# Patient Record
Sex: FEMALE | Race: OTHER | NOT HISPANIC OR LATINO | URBAN - METROPOLITAN AREA
[De-identification: names, ages, dates, MRNs, and addresses within clinical notes are randomized per-mention and may not be internally consistent; named-entity substitution may affect disease eponyms.]

---

## 2023-12-22 ENCOUNTER — INPATIENT (INPATIENT)
Facility: HOSPITAL | Age: 68
LOS: 5 days | Discharge: HOME | DRG: 64 | End: 2023-12-28
Attending: STUDENT IN AN ORGANIZED HEALTH CARE EDUCATION/TRAINING PROGRAM | Admitting: STUDENT IN AN ORGANIZED HEALTH CARE EDUCATION/TRAINING PROGRAM
Payer: MEDICARE

## 2023-12-22 VITALS
WEIGHT: 147.05 LBS | DIASTOLIC BLOOD PRESSURE: 100 MMHG | OXYGEN SATURATION: 99 % | RESPIRATION RATE: 19 BRPM | SYSTOLIC BLOOD PRESSURE: 135 MMHG | TEMPERATURE: 98 F | HEART RATE: 109 BPM

## 2023-12-22 DIAGNOSIS — I62.9 NONTRAUMATIC INTRACRANIAL HEMORRHAGE, UNSPECIFIED: ICD-10-CM

## 2023-12-22 LAB
ALBUMIN SERPL ELPH-MCNC: 4.4 G/DL — SIGNIFICANT CHANGE UP (ref 3.5–5.2)
ALBUMIN SERPL ELPH-MCNC: 4.4 G/DL — SIGNIFICANT CHANGE UP (ref 3.5–5.2)
ALP SERPL-CCNC: 110 U/L — SIGNIFICANT CHANGE UP (ref 30–115)
ALP SERPL-CCNC: 110 U/L — SIGNIFICANT CHANGE UP (ref 30–115)
ALT FLD-CCNC: 14 U/L — SIGNIFICANT CHANGE UP (ref 0–41)
ALT FLD-CCNC: 14 U/L — SIGNIFICANT CHANGE UP (ref 0–41)
ANION GAP SERPL CALC-SCNC: 10 MMOL/L — SIGNIFICANT CHANGE UP (ref 7–14)
ANION GAP SERPL CALC-SCNC: 10 MMOL/L — SIGNIFICANT CHANGE UP (ref 7–14)
APTT BLD: 32.4 SEC — SIGNIFICANT CHANGE UP (ref 27–39.2)
APTT BLD: 32.4 SEC — SIGNIFICANT CHANGE UP (ref 27–39.2)
AST SERPL-CCNC: 24 U/L — SIGNIFICANT CHANGE UP (ref 0–41)
AST SERPL-CCNC: 24 U/L — SIGNIFICANT CHANGE UP (ref 0–41)
BASOPHILS # BLD AUTO: 0.05 K/UL — SIGNIFICANT CHANGE UP (ref 0–0.2)
BASOPHILS # BLD AUTO: 0.05 K/UL — SIGNIFICANT CHANGE UP (ref 0–0.2)
BASOPHILS NFR BLD AUTO: 0.4 % — SIGNIFICANT CHANGE UP (ref 0–1)
BASOPHILS NFR BLD AUTO: 0.4 % — SIGNIFICANT CHANGE UP (ref 0–1)
BILIRUB SERPL-MCNC: <0.2 MG/DL — SIGNIFICANT CHANGE UP (ref 0.2–1.2)
BILIRUB SERPL-MCNC: <0.2 MG/DL — SIGNIFICANT CHANGE UP (ref 0.2–1.2)
BUN SERPL-MCNC: 14 MG/DL — SIGNIFICANT CHANGE UP (ref 10–20)
BUN SERPL-MCNC: 14 MG/DL — SIGNIFICANT CHANGE UP (ref 10–20)
CALCIUM SERPL-MCNC: 9 MG/DL — SIGNIFICANT CHANGE UP (ref 8.4–10.4)
CALCIUM SERPL-MCNC: 9 MG/DL — SIGNIFICANT CHANGE UP (ref 8.4–10.4)
CHLORIDE SERPL-SCNC: 99 MMOL/L — SIGNIFICANT CHANGE UP (ref 98–110)
CHLORIDE SERPL-SCNC: 99 MMOL/L — SIGNIFICANT CHANGE UP (ref 98–110)
CO2 SERPL-SCNC: 25 MMOL/L — SIGNIFICANT CHANGE UP (ref 17–32)
CO2 SERPL-SCNC: 25 MMOL/L — SIGNIFICANT CHANGE UP (ref 17–32)
CREAT SERPL-MCNC: 0.6 MG/DL — LOW (ref 0.7–1.5)
CREAT SERPL-MCNC: 0.6 MG/DL — LOW (ref 0.7–1.5)
EGFR: 98 ML/MIN/1.73M2 — SIGNIFICANT CHANGE UP
EGFR: 98 ML/MIN/1.73M2 — SIGNIFICANT CHANGE UP
EOSINOPHIL # BLD AUTO: 0 K/UL — SIGNIFICANT CHANGE UP (ref 0–0.7)
EOSINOPHIL # BLD AUTO: 0 K/UL — SIGNIFICANT CHANGE UP (ref 0–0.7)
EOSINOPHIL NFR BLD AUTO: 0 % — SIGNIFICANT CHANGE UP (ref 0–8)
EOSINOPHIL NFR BLD AUTO: 0 % — SIGNIFICANT CHANGE UP (ref 0–8)
GLUCOSE SERPL-MCNC: 101 MG/DL — HIGH (ref 70–99)
GLUCOSE SERPL-MCNC: 101 MG/DL — HIGH (ref 70–99)
HCT VFR BLD CALC: 44.5 % — SIGNIFICANT CHANGE UP (ref 37–47)
HCT VFR BLD CALC: 44.5 % — SIGNIFICANT CHANGE UP (ref 37–47)
HGB BLD-MCNC: 14.4 G/DL — SIGNIFICANT CHANGE UP (ref 12–16)
HGB BLD-MCNC: 14.4 G/DL — SIGNIFICANT CHANGE UP (ref 12–16)
IMM GRANULOCYTES NFR BLD AUTO: 1.8 % — HIGH (ref 0.1–0.3)
IMM GRANULOCYTES NFR BLD AUTO: 1.8 % — HIGH (ref 0.1–0.3)
INR BLD: 1 RATIO — SIGNIFICANT CHANGE UP (ref 0.65–1.3)
INR BLD: 1 RATIO — SIGNIFICANT CHANGE UP (ref 0.65–1.3)
LYMPHOCYTES # BLD AUTO: 0.79 K/UL — LOW (ref 1.2–3.4)
LYMPHOCYTES # BLD AUTO: 0.79 K/UL — LOW (ref 1.2–3.4)
LYMPHOCYTES # BLD AUTO: 6.6 % — LOW (ref 20.5–51.1)
LYMPHOCYTES # BLD AUTO: 6.6 % — LOW (ref 20.5–51.1)
MCHC RBC-ENTMCNC: 27.5 PG — SIGNIFICANT CHANGE UP (ref 27–31)
MCHC RBC-ENTMCNC: 27.5 PG — SIGNIFICANT CHANGE UP (ref 27–31)
MCHC RBC-ENTMCNC: 32.4 G/DL — SIGNIFICANT CHANGE UP (ref 32–37)
MCHC RBC-ENTMCNC: 32.4 G/DL — SIGNIFICANT CHANGE UP (ref 32–37)
MCV RBC AUTO: 85.1 FL — SIGNIFICANT CHANGE UP (ref 81–99)
MCV RBC AUTO: 85.1 FL — SIGNIFICANT CHANGE UP (ref 81–99)
MONOCYTES # BLD AUTO: 0.7 K/UL — HIGH (ref 0.1–0.6)
MONOCYTES # BLD AUTO: 0.7 K/UL — HIGH (ref 0.1–0.6)
MONOCYTES NFR BLD AUTO: 5.8 % — SIGNIFICANT CHANGE UP (ref 1.7–9.3)
MONOCYTES NFR BLD AUTO: 5.8 % — SIGNIFICANT CHANGE UP (ref 1.7–9.3)
NEUTROPHILS # BLD AUTO: 10.25 K/UL — HIGH (ref 1.4–6.5)
NEUTROPHILS # BLD AUTO: 10.25 K/UL — HIGH (ref 1.4–6.5)
NEUTROPHILS NFR BLD AUTO: 85.4 % — HIGH (ref 42.2–75.2)
NEUTROPHILS NFR BLD AUTO: 85.4 % — HIGH (ref 42.2–75.2)
NRBC # BLD: 0 /100 WBCS — SIGNIFICANT CHANGE UP (ref 0–0)
NRBC # BLD: 0 /100 WBCS — SIGNIFICANT CHANGE UP (ref 0–0)
PLATELET # BLD AUTO: 299 K/UL — SIGNIFICANT CHANGE UP (ref 130–400)
PLATELET # BLD AUTO: 299 K/UL — SIGNIFICANT CHANGE UP (ref 130–400)
PMV BLD: 10.9 FL — HIGH (ref 7.4–10.4)
PMV BLD: 10.9 FL — HIGH (ref 7.4–10.4)
POTASSIUM SERPL-MCNC: 4.7 MMOL/L — SIGNIFICANT CHANGE UP (ref 3.5–5)
POTASSIUM SERPL-MCNC: 4.7 MMOL/L — SIGNIFICANT CHANGE UP (ref 3.5–5)
POTASSIUM SERPL-SCNC: 4.7 MMOL/L — SIGNIFICANT CHANGE UP (ref 3.5–5)
POTASSIUM SERPL-SCNC: 4.7 MMOL/L — SIGNIFICANT CHANGE UP (ref 3.5–5)
PROT SERPL-MCNC: 7.8 G/DL — SIGNIFICANT CHANGE UP (ref 6–8)
PROT SERPL-MCNC: 7.8 G/DL — SIGNIFICANT CHANGE UP (ref 6–8)
PROTHROM AB SERPL-ACNC: 11.4 SEC — SIGNIFICANT CHANGE UP (ref 9.95–12.87)
PROTHROM AB SERPL-ACNC: 11.4 SEC — SIGNIFICANT CHANGE UP (ref 9.95–12.87)
RBC # BLD: 5.23 M/UL — SIGNIFICANT CHANGE UP (ref 4.2–5.4)
RBC # BLD: 5.23 M/UL — SIGNIFICANT CHANGE UP (ref 4.2–5.4)
RBC # FLD: 13.3 % — SIGNIFICANT CHANGE UP (ref 11.5–14.5)
RBC # FLD: 13.3 % — SIGNIFICANT CHANGE UP (ref 11.5–14.5)
SODIUM SERPL-SCNC: 134 MMOL/L — LOW (ref 135–146)
SODIUM SERPL-SCNC: 134 MMOL/L — LOW (ref 135–146)
TROPONIN T, HIGH SENSITIVITY RESULT: 11 NG/L — SIGNIFICANT CHANGE UP (ref 6–13)
TROPONIN T, HIGH SENSITIVITY RESULT: 11 NG/L — SIGNIFICANT CHANGE UP (ref 6–13)
WBC # BLD: 12.01 K/UL — HIGH (ref 4.8–10.8)
WBC # BLD: 12.01 K/UL — HIGH (ref 4.8–10.8)
WBC # FLD AUTO: 12.01 K/UL — HIGH (ref 4.8–10.8)
WBC # FLD AUTO: 12.01 K/UL — HIGH (ref 4.8–10.8)

## 2023-12-22 PROCEDURE — 85025 COMPLETE CBC W/AUTO DIFF WBC: CPT

## 2023-12-22 PROCEDURE — 97530 THERAPEUTIC ACTIVITIES: CPT | Mod: GP

## 2023-12-22 PROCEDURE — 83036 HEMOGLOBIN GLYCOSYLATED A1C: CPT

## 2023-12-22 PROCEDURE — 93010 ELECTROCARDIOGRAM REPORT: CPT

## 2023-12-22 PROCEDURE — 85027 COMPLETE CBC AUTOMATED: CPT

## 2023-12-22 PROCEDURE — 36415 COLL VENOUS BLD VENIPUNCTURE: CPT

## 2023-12-22 PROCEDURE — 84100 ASSAY OF PHOSPHORUS: CPT

## 2023-12-22 PROCEDURE — 70450 CT HEAD/BRAIN W/O DYE: CPT | Mod: 26,59

## 2023-12-22 PROCEDURE — 80048 BASIC METABOLIC PNL TOTAL CA: CPT

## 2023-12-22 PROCEDURE — 84443 ASSAY THYROID STIM HORMONE: CPT

## 2023-12-22 PROCEDURE — 70450 CT HEAD/BRAIN W/O DYE: CPT | Mod: 26,77,59,MA

## 2023-12-22 PROCEDURE — 97166 OT EVAL MOD COMPLEX 45 MIN: CPT | Mod: GO

## 2023-12-22 PROCEDURE — 99291 CRITICAL CARE FIRST HOUR: CPT

## 2023-12-22 PROCEDURE — 84439 ASSAY OF FREE THYROXINE: CPT

## 2023-12-22 PROCEDURE — 70498 CT ANGIOGRAPHY NECK: CPT | Mod: 26,MA

## 2023-12-22 PROCEDURE — 70496 CT ANGIOGRAPHY HEAD: CPT | Mod: 26,MA

## 2023-12-22 PROCEDURE — 97162 PT EVAL MOD COMPLEX 30 MIN: CPT | Mod: GP

## 2023-12-22 PROCEDURE — 71045 X-RAY EXAM CHEST 1 VIEW: CPT | Mod: 26

## 2023-12-22 PROCEDURE — 70551 MRI BRAIN STEM W/O DYE: CPT

## 2023-12-22 PROCEDURE — 70450 CT HEAD/BRAIN W/O DYE: CPT

## 2023-12-22 PROCEDURE — 94760 N-INVAS EAR/PLS OXIMETRY 1: CPT

## 2023-12-22 PROCEDURE — 99291 CRITICAL CARE FIRST HOUR: CPT | Mod: GC

## 2023-12-22 PROCEDURE — 0225U NFCT DS DNA&RNA 21 SARSCOV2: CPT

## 2023-12-22 PROCEDURE — 93306 TTE W/DOPPLER COMPLETE: CPT

## 2023-12-22 PROCEDURE — 80053 COMPREHEN METABOLIC PANEL: CPT

## 2023-12-22 PROCEDURE — 83735 ASSAY OF MAGNESIUM: CPT

## 2023-12-22 PROCEDURE — 80061 LIPID PANEL: CPT

## 2023-12-22 PROCEDURE — 97116 GAIT TRAINING THERAPY: CPT | Mod: GP

## 2023-12-22 PROCEDURE — 86803 HEPATITIS C AB TEST: CPT

## 2023-12-22 PROCEDURE — 93005 ELECTROCARDIOGRAM TRACING: CPT

## 2023-12-22 RX ORDER — SODIUM CHLORIDE 9 MG/ML
1000 INJECTION INTRAMUSCULAR; INTRAVENOUS; SUBCUTANEOUS
Refills: 0 | Status: DISCONTINUED | OUTPATIENT
Start: 2023-12-22 | End: 2023-12-23

## 2023-12-22 RX ORDER — DORZOLAMIDE HYDROCHLORIDE TIMOLOL MALEATE 20; 5 MG/ML; MG/ML
1 SOLUTION/ DROPS OPHTHALMIC
Refills: 0 | Status: DISCONTINUED | OUTPATIENT
Start: 2023-12-22 | End: 2023-12-24

## 2023-12-22 RX ORDER — SENNA PLUS 8.6 MG/1
2 TABLET ORAL AT BEDTIME
Refills: 0 | Status: DISCONTINUED | OUTPATIENT
Start: 2023-12-22 | End: 2023-12-27

## 2023-12-22 RX ORDER — LATANOPROST 0.05 MG/ML
0 SOLUTION/ DROPS OPHTHALMIC; TOPICAL
Qty: 0 | Refills: 0 | DISCHARGE

## 2023-12-22 RX ORDER — LATANOPROST 0.05 MG/ML
1 SOLUTION/ DROPS OPHTHALMIC; TOPICAL
Refills: 0 | DISCHARGE

## 2023-12-22 RX ORDER — LATANOPROST 0.05 MG/ML
1 SOLUTION/ DROPS OPHTHALMIC; TOPICAL AT BEDTIME
Refills: 0 | Status: DISCONTINUED | OUTPATIENT
Start: 2023-12-22 | End: 2023-12-24

## 2023-12-22 RX ORDER — NICARDIPINE HYDROCHLORIDE 30 MG/1
5 CAPSULE, EXTENDED RELEASE ORAL
Qty: 40 | Refills: 0 | Status: DISCONTINUED | OUTPATIENT
Start: 2023-12-22 | End: 2023-12-24

## 2023-12-22 RX ORDER — BRIMONIDINE TARTRATE 2 MG/MG
0 SOLUTION/ DROPS OPHTHALMIC
Qty: 0 | Refills: 0 | DISCHARGE

## 2023-12-22 RX ORDER — DORZOLAMIDE HYDROCHLORIDE TIMOLOL MALEATE 20; 5 MG/ML; MG/ML
0 SOLUTION/ DROPS OPHTHALMIC
Qty: 0 | Refills: 0 | DISCHARGE

## 2023-12-22 RX ORDER — ACETAMINOPHEN 500 MG
650 TABLET ORAL EVERY 6 HOURS
Refills: 0 | Status: DISCONTINUED | OUTPATIENT
Start: 2023-12-22 | End: 2023-12-28

## 2023-12-22 RX ORDER — BRIMONIDINE TARTRATE 2 MG/MG
1 SOLUTION/ DROPS OPHTHALMIC
Refills: 0 | Status: DISCONTINUED | OUTPATIENT
Start: 2023-12-22 | End: 2023-12-24

## 2023-12-22 RX ORDER — PANTOPRAZOLE SODIUM 20 MG/1
40 TABLET, DELAYED RELEASE ORAL
Refills: 0 | Status: DISCONTINUED | OUTPATIENT
Start: 2023-12-22 | End: 2023-12-28

## 2023-12-22 RX ORDER — NICARDIPINE HYDROCHLORIDE 30 MG/1
5 CAPSULE, EXTENDED RELEASE ORAL
Qty: 40 | Refills: 0 | Status: DISCONTINUED | OUTPATIENT
Start: 2023-12-22 | End: 2023-12-22

## 2023-12-22 RX ORDER — CHLORHEXIDINE GLUCONATE 213 G/1000ML
1 SOLUTION TOPICAL
Refills: 0 | Status: DISCONTINUED | OUTPATIENT
Start: 2023-12-22 | End: 2023-12-28

## 2023-12-22 RX ORDER — OMEPRAZOLE 10 MG/1
1 CAPSULE, DELAYED RELEASE ORAL
Refills: 0 | DISCHARGE

## 2023-12-22 RX ORDER — LABETALOL HCL 100 MG
10 TABLET ORAL ONCE
Refills: 0 | Status: COMPLETED | OUTPATIENT
Start: 2023-12-22 | End: 2023-12-22

## 2023-12-22 RX ADMIN — SODIUM CHLORIDE 70 MILLILITER(S): 9 INJECTION INTRAMUSCULAR; INTRAVENOUS; SUBCUTANEOUS at 17:52

## 2023-12-22 RX ADMIN — Medication 10 MILLIGRAM(S): at 15:51

## 2023-12-22 RX ADMIN — NICARDIPINE HYDROCHLORIDE 25 MG/HR: 30 CAPSULE, EXTENDED RELEASE ORAL at 15:51

## 2023-12-22 RX ADMIN — NICARDIPINE HYDROCHLORIDE 25 MG/HR: 30 CAPSULE, EXTENDED RELEASE ORAL at 17:55

## 2023-12-22 NOTE — H&P ADULT - HISTORY OF PRESENT ILLNESS
68F w/ PMHx of GERD, glaucoma presents for L sided numbness and LUE weakness that started this morning at 10am. Pt initially went to urgent care for evaluation, however SBP was measured at 190, 220 on repeat; pt was instructed to go to ED. SBP in 220s in ED. CTH showed 1.3cm R thalamic IPH. Pt denies any AC/AP. Pt given Labetalol IVP and started on Nicardipine gtt.      Admission scores:  ICH: 0

## 2023-12-22 NOTE — H&P ADULT - NSHPPHYSICALEXAM_GEN_ALL_CORE
EXAMINATION:  General: WN/WD, no acute distress  HENT: NC/AT, moist oral mucosa  Eyes: Anicteric sclerae  Cardiac: [id number]  Lungs: Clear  Abdomen: Soft, non-tender, +BS  Extremities: No c/c/e  Skin/Incision Site: Clean, dry and intact  Neurologic: Awake, alert, fully oriented, follows commands, PERRL, VFFtc, EOMI, face symmetric, tongue midline, no drift, 5/5 strength RUE/RLE/LLE, 4+/5 strength LUE. Decreased sensation L face, LUE, LLE EXAMINATION:  General: WN/WD, no acute distress  HENT: NC/AT, moist oral mucosa  Eyes: Anicteric sclerae  Cardiac: [id number]  Lungs: Clear  Abdomen: Soft, non-tender, +BS  Extremities: No c/c/e  Skin/Incision Site: Clean, dry and intact  Neurologic: Awake, alert, fully oriented, follows commands, PERRL, VFFtc, EOMI, face symmetric, tongue midline, no drift, 5/5 strength RUE/RLE/LLE, 4+/5 strength LUE. Decreased sensation L face, LUE, LLE

## 2023-12-22 NOTE — ED ADULT NURSE NOTE - NSFALLHARMRISKINTERV_ED_ALL_ED
Assistance OOB with selected safe patient handling equipment if applicable/Assistance with ambulation/Communicate risk of Fall with Harm to all staff, patient, and family/Monitor gait and stability/Provide visual cue: red socks, yellow wristband, yellow gown, etc/Reinforce activity limits and safety measures with patient and family/Bed in lowest position, wheels locked, appropriate side rails in place/Call bell, personal items and telephone in reach/Instruct patient to call for assistance before getting out of bed/chair/stretcher/Non-slip footwear applied when patient is off stretcher/[place] to call system/Physically safe environment - no spills, clutter or unnecessary equipment/Purposeful Proactive Rounding/Room/bathroom lighting operational, light cord in reach Assistance OOB with selected safe patient handling equipment if applicable/Assistance with ambulation/Communicate risk of Fall with Harm to all staff, patient, and family/Monitor gait and stability/Provide visual cue: red socks, yellow wristband, yellow gown, etc/Reinforce activity limits and safety measures with patient and family/Bed in lowest position, wheels locked, appropriate side rails in place/Call bell, personal items and telephone in reach/Instruct patient to call for assistance before getting out of bed/chair/stretcher/Non-slip footwear applied when patient is off stretcher/[place] to call system/Physically safe environment - no spills, clutter or unnecessary equipment/Purposeful Proactive Rounding/Room/bathroom lighting operational, light cord in reach

## 2023-12-22 NOTE — ED PROVIDER NOTE - PHYSICAL EXAMINATION
VITAL SIGNS: I have reviewed nursing notes and confirm.  CONSTITUTIONAL: Well-developed; well-nourished; in no acute distress.  SKIN: Skin exam is warm and dry, no acute rash.  HEAD: Normocephalic; atraumatic.  EYES: PERRL, EOM intact; conjunctiva and sclera clear.  ENT: mmm  CARD: S1, S2 normal; no murmurs, gallops, or rubs. Regular rate and rhythm.  RESP: Normal respiratory effort, no tachypnea or distress. Lungs CTAB, no wheezes, rales or rhonchi.  ABD: soft, NT/ND.  EXT: Normal ROM. No clubbing, cyanosis or edema.  Neuro: A&Ox3, normal speech, face appears symmetric to me without droops. decreased sensation to L side of face in all distributions otherwise CN II-XII intact, FROM & strength 5/5 x4 extremities. neurology team exam with mild L arm drift however no drift on my exam. Normal gait however son states smaller steps than normal  PSYCH: Cooperative, appropriate.

## 2023-12-22 NOTE — ED PROVIDER NOTE - CLINICAL SUMMARY MEDICAL DECISION MAKING FREE TEXT BOX
68-year-old female no significant past medical history presenting for evaluation of left-sided facial numbness, left upper and lower extremity numbness/weakness.  Symptom onset 10 AM, last known well immediately prior.  Well appearing, NAD, non toxic. NCAT PERRLA EOMI neck supple non tender normal wob cta bl rrr abdomen s nt nd no rebound no guarding WWPx4 sensory deficit to left face, upper and lower extremity, left upper extremity drift, otherwise neuro intact.  Stroke code called upon arrival.  Labs imaging reviewed.  Patient found to have right thalamic hemorrhage.  Patient severely hypertensive, status post labetalol, Cardene drip.  Status post evaluation by neurology, neurosurgery and neurocritical care. 68-year-old female no significant past medical history presenting for evaluation of left-sided facial numbness, left upper and lower extremity numbness/weakness.  Symptom onset 10 AM, last known well immediately prior.  Well appearing, NAD, non toxic. NCAT PERRLA EOMI neck supple non tender normal wob cta bl rrr abdomen s nt nd no rebound no guarding WWPx4 sensory deficit to left face, upper and lower extremity, left upper extremity drift, otherwise neuro intact.  Stroke code called upon arrival.  Labs imaging reviewed.  Patient found to have right thalamic hemorrhage.  Patient severely hypertensive, status post labetalol, Cardene drip.  Status post evaluation by neurology, neurosurgery and neurocritical care. will admit for further eval

## 2023-12-22 NOTE — ED PROVIDER NOTE - PROGRESS NOTE DETAILS
JAYDEN: Stroke code called on arrival.  Intraparenchymal bleeding noted.  Patient brought back to critical care, given labetalol and nicardipine drip for  systolic.  Received CT angios.  Arterial line placed.  Patient admitted to neurocritical care

## 2023-12-22 NOTE — H&P ADULT - ASSESSMENT
ASSESSMENT/PLAN: 68F w/ PMHx of GERD, glaucoma admitted with R thalamic IPH (ICH 0), hypertensive emergency    NEURO:  q1h neurochecks  Repeat CTH 4hrs  CTA negative  NSGY following- no intervention  Activity: [] OOB as tolerated [] Bedrest [x] PT [x] OT [] PMNR    PULM:  Incentive spirometry, mobilize as tolerated  SpO2>94%  HOB>30  Aspiration precautions    CV:  -180  Nicardipine gtt  EKG  TTE    RENAL:  NS at 70mL/hr  Monitor I&Os    GI:  Diet: Dysphagia screen and then advance diet as tolerated  GI prophylaxis [] not indicated [x] PPI- home med [] pepcid  Bowel regimen [] miralax [x] senna [] other:    ENDO:   Goal -180  Check hgba1c, lipid panel, TFTs    HEME/ONC:  VTE prophylaxis: [x] SCDs [] chemoprophylaxis [x] hold chemoprophylaxis due to: ICH    ID:  Maintain normothermia    MISC:    SOCIAL/FAMILY:  [] awaiting [x] updated at bedside [] family meeting    CODE STATUS:  [x] Full Code [] DNR [] DNI [] Palliative/Comfort Care    DISPOSITION:  [x] ICU [] Stroke Unit [] Floor [] CEU [] Tele ASSESSMENT/PLAN: 68F w/ PMHx of GERD, glaucoma admitted with R thalamic IPH (ICH 0), hypertensive emergency    NEURO:  q1h neurochecks  Repeat CTH 4hrs  CTA negative  NSGY following- no intervention  Activity: [] OOB as tolerated [] Bedrest [x] PT [x] OT [] PMNR    PULM:  Incentive spirometry, mobilize as tolerated  SpO2>94%  HOB>30  Aspiration precautions    CV:  Pt presented with -240- so would decrease SBP by 20% in first hour, then gradually decrease judiciously over next several hours as patient tolerates (and will ensure stability interval CTH)  Nicardipine gtt  EKG  TTE    RENAL:  NS at 70mL/hr  Monitor I&Os    GI:  Diet: Dysphagia screen and then advance diet as tolerated  GI prophylaxis [] not indicated [x] PPI- home med [] pepcid  Bowel regimen [] miralax [x] senna [] other:    ENDO:   Goal -180  Check hgba1c, lipid panel, TFTs    HEME/ONC:  VTE prophylaxis: [x] SCDs [] chemoprophylaxis [x] hold chemoprophylaxis due to: ICH    ID:  Maintain normothermia    MISC:    SOCIAL/FAMILY:  [] awaiting [x] updated at bedside [] family meeting    CODE STATUS:  [x] Full Code [] DNR [] DNI [] Palliative/Comfort Care    DISPOSITION:  [x] ICU [] Stroke Unit [] Floor [] CEU [] Tele

## 2023-12-22 NOTE — ED ADULT NURSE REASSESSMENT NOTE - NS ED NURSE REASSESS COMMENT FT1
Pt received from previous RN on continuous cardiac monitoring with left radial a line. Pt NICU admit with ICH.   CTH showed R thalamic IPH - neuro checks q1 hour with pending repeat head CT.   Pt  when received - Nicardipine drip restarted at 5mg/hr and a line zeroed out.  Family at bedside, aware and in agreement with plan of care.

## 2023-12-22 NOTE — H&P ADULT - NSHPLABSRESULTS_GEN_ALL_CORE
LABS:  Na: 134 (12-22 @ 16:18)  K: 4.7 (12-22 @ 16:18)  Cl: 99 (12-22 @ 16:18)  CO2: 25 (12-22 @ 16:18)  BUN: 14 (12-22 @ 16:18)  Cr: 0.6 (12-22 @ 16:18)  Glu: 101(12-22 @ 16:18)    Hgb: 14.4 (12-22 @ 16:18)  Hct: 44.5 (12-22 @ 16:18)  WBC: 12.01 (12-22 @ 16:18)  Plt: 299 (12-22 @ 16:18)    INR: 1.00 12-22-23 @ 16:18  PTT: 32.4 12-22-23 @ 16:18      LIVER FUNCTIONS - ( 22 Dec 2023 16:18 )  Alb: 4.4 g/dL / Pro: 7.8 g/dL / ALK PHOS: 110 U/L / ALT: 14 U/L / AST: 24 U/L / GGT: x             < from: CT Brain Stroke Protocol (12.22.23 @ 15:41) >    IMPRESSION:    Right thalamic parenchymal hematoma measuring up to 1.3 cm.    < end of copied text >      < from: CT Angio Neck Stroke Protocol w/ IV Cont (12.22.23 @ 16:25) >      IMPRESSION:    No evidence of major vascular stenosis or occlusion.    < end of copied text >

## 2023-12-22 NOTE — CONSULT NOTE ADULT - SUBJECTIVE AND OBJECTIVE BOX
HPI: 67 y/o female transient left sided weakness starting at 10 am this am, comes in now with mostly complaints of decreased sensation of her left side as per family member, patient not on blood thinners. BP on admission was systolic of 220.      PAST MEDICAL & SURGICAL HISTORY: Glaucoma, Gerd      Home Medications: Eye drops, Omiprazole      Allergies    No Known Allergies    Intolerances        MEDICATIONS  (STANDING):  niCARdipine Infusion 5 mG/Hr (25 mL/Hr) IV Continuous <Continuous>    MEDICATIONS  (PRN):      ICU Vital Signs Last 24 Hrs  T(C): 36.8 (22 Dec 2023 15:21), Max: 36.8 (22 Dec 2023 15:21)  T(F): 98.3 (22 Dec 2023 15:21), Max: 98.3 (22 Dec 2023 15:21)  HR: 93 (22 Dec 2023 15:53) (93 - 109)  BP: 191/91 (22 Dec 2023 15:53) (135/100 - 191/91)  BP(mean): --  ABP: --  ABP(mean): --  RR: 18 (22 Dec 2023 15:53) (18 - 19)  SpO2: 98% (22 Dec 2023 15:53) (98% - 99%)    O2 Parameters below as of 22 Dec 2023 15:53  Patient On (Oxygen Delivery Method): room air    I&O's Detail    AAOX3. Verbal function intact  tongue midline, facial motions symmetric  PERRLA, EOMI  Pronator Drift: neg  Motor: MAEx4, 5/5 power in b/l UE and LE  Sensation: slight decreased sensation of extremities left side      Imaging: < from: CT Brain Stroke Protocol (12.22.23 @ 15:41) >    IMPRESSION:    Right thalamic parenchymal hematoma measuring up to 1.3 cm.    Spoke with STEPHY CONTRERAS MD on 12/22/2023 3:48 PM with readback.    < end of copied text >      Assessment/Plan small right thalamic bleed, Recommend CTA, control BP, RHCT, neurology

## 2023-12-22 NOTE — ED PROVIDER NOTE - OBJECTIVE STATEMENT
68-year-old female with PMH of GERD, glaucoma, left eye blindness, presents ED for evaluation of left-sided facial numbness since 10 AM.  Also reports left arm weakness since this time.  Denies recent fall or trauma, vision hearing changes, anticoagulation use, CP, SOB, palpitations, abdominal pain, N/V/D, extremity numbness.

## 2023-12-23 LAB
A1C WITH ESTIMATED AVERAGE GLUCOSE RESULT: 5.9 % — HIGH (ref 4–5.6)
A1C WITH ESTIMATED AVERAGE GLUCOSE RESULT: 5.9 % — HIGH (ref 4–5.6)
ALBUMIN SERPL ELPH-MCNC: 4 G/DL — SIGNIFICANT CHANGE UP (ref 3.5–5.2)
ALBUMIN SERPL ELPH-MCNC: 4 G/DL — SIGNIFICANT CHANGE UP (ref 3.5–5.2)
ALP SERPL-CCNC: 95 U/L — SIGNIFICANT CHANGE UP (ref 30–115)
ALP SERPL-CCNC: 95 U/L — SIGNIFICANT CHANGE UP (ref 30–115)
ALT FLD-CCNC: 12 U/L — SIGNIFICANT CHANGE UP (ref 0–41)
ALT FLD-CCNC: 12 U/L — SIGNIFICANT CHANGE UP (ref 0–41)
ANION GAP SERPL CALC-SCNC: 10 MMOL/L — SIGNIFICANT CHANGE UP (ref 7–14)
ANION GAP SERPL CALC-SCNC: 10 MMOL/L — SIGNIFICANT CHANGE UP (ref 7–14)
AST SERPL-CCNC: 13 U/L — SIGNIFICANT CHANGE UP (ref 0–41)
AST SERPL-CCNC: 13 U/L — SIGNIFICANT CHANGE UP (ref 0–41)
BASOPHILS # BLD AUTO: 0.04 K/UL — SIGNIFICANT CHANGE UP (ref 0–0.2)
BASOPHILS # BLD AUTO: 0.04 K/UL — SIGNIFICANT CHANGE UP (ref 0–0.2)
BASOPHILS NFR BLD AUTO: 0.3 % — SIGNIFICANT CHANGE UP (ref 0–1)
BASOPHILS NFR BLD AUTO: 0.3 % — SIGNIFICANT CHANGE UP (ref 0–1)
BILIRUB SERPL-MCNC: 0.4 MG/DL — SIGNIFICANT CHANGE UP (ref 0.2–1.2)
BILIRUB SERPL-MCNC: 0.4 MG/DL — SIGNIFICANT CHANGE UP (ref 0.2–1.2)
BUN SERPL-MCNC: 9 MG/DL — LOW (ref 10–20)
BUN SERPL-MCNC: 9 MG/DL — LOW (ref 10–20)
CALCIUM SERPL-MCNC: 8.4 MG/DL — SIGNIFICANT CHANGE UP (ref 8.4–10.5)
CALCIUM SERPL-MCNC: 8.4 MG/DL — SIGNIFICANT CHANGE UP (ref 8.4–10.5)
CHLORIDE SERPL-SCNC: 106 MMOL/L — SIGNIFICANT CHANGE UP (ref 98–110)
CHLORIDE SERPL-SCNC: 106 MMOL/L — SIGNIFICANT CHANGE UP (ref 98–110)
CHOLEST SERPL-MCNC: 155 MG/DL — SIGNIFICANT CHANGE UP
CHOLEST SERPL-MCNC: 155 MG/DL — SIGNIFICANT CHANGE UP
CO2 SERPL-SCNC: 22 MMOL/L — SIGNIFICANT CHANGE UP (ref 17–32)
CO2 SERPL-SCNC: 22 MMOL/L — SIGNIFICANT CHANGE UP (ref 17–32)
CREAT SERPL-MCNC: <0.5 MG/DL — LOW (ref 0.7–1.5)
CREAT SERPL-MCNC: <0.5 MG/DL — LOW (ref 0.7–1.5)
EGFR: 108 ML/MIN/1.73M2 — SIGNIFICANT CHANGE UP
EGFR: 108 ML/MIN/1.73M2 — SIGNIFICANT CHANGE UP
EOSINOPHIL # BLD AUTO: 0.02 K/UL — SIGNIFICANT CHANGE UP (ref 0–0.7)
EOSINOPHIL # BLD AUTO: 0.02 K/UL — SIGNIFICANT CHANGE UP (ref 0–0.7)
EOSINOPHIL NFR BLD AUTO: 0.2 % — SIGNIFICANT CHANGE UP (ref 0–8)
EOSINOPHIL NFR BLD AUTO: 0.2 % — SIGNIFICANT CHANGE UP (ref 0–8)
ESTIMATED AVERAGE GLUCOSE: 123 MG/DL — HIGH (ref 68–114)
ESTIMATED AVERAGE GLUCOSE: 123 MG/DL — HIGH (ref 68–114)
GLUCOSE SERPL-MCNC: 118 MG/DL — HIGH (ref 70–99)
GLUCOSE SERPL-MCNC: 118 MG/DL — HIGH (ref 70–99)
HCT VFR BLD CALC: 39.4 % — SIGNIFICANT CHANGE UP (ref 37–47)
HCT VFR BLD CALC: 39.4 % — SIGNIFICANT CHANGE UP (ref 37–47)
HCV AB S/CO SERPL IA: 0.04 COI — SIGNIFICANT CHANGE UP
HCV AB S/CO SERPL IA: 0.04 COI — SIGNIFICANT CHANGE UP
HCV AB SERPL-IMP: SIGNIFICANT CHANGE UP
HCV AB SERPL-IMP: SIGNIFICANT CHANGE UP
HDLC SERPL-MCNC: 51 MG/DL — SIGNIFICANT CHANGE UP
HDLC SERPL-MCNC: 51 MG/DL — SIGNIFICANT CHANGE UP
HGB BLD-MCNC: 13 G/DL — SIGNIFICANT CHANGE UP (ref 12–16)
HGB BLD-MCNC: 13 G/DL — SIGNIFICANT CHANGE UP (ref 12–16)
IMM GRANULOCYTES NFR BLD AUTO: 0.3 % — SIGNIFICANT CHANGE UP (ref 0.1–0.3)
IMM GRANULOCYTES NFR BLD AUTO: 0.3 % — SIGNIFICANT CHANGE UP (ref 0.1–0.3)
LIPID PNL WITH DIRECT LDL SERPL: 94 MG/DL — SIGNIFICANT CHANGE UP
LIPID PNL WITH DIRECT LDL SERPL: 94 MG/DL — SIGNIFICANT CHANGE UP
LYMPHOCYTES # BLD AUTO: 1.39 K/UL — SIGNIFICANT CHANGE UP (ref 1.2–3.4)
LYMPHOCYTES # BLD AUTO: 1.39 K/UL — SIGNIFICANT CHANGE UP (ref 1.2–3.4)
LYMPHOCYTES # BLD AUTO: 12.1 % — LOW (ref 20.5–51.1)
LYMPHOCYTES # BLD AUTO: 12.1 % — LOW (ref 20.5–51.1)
MAGNESIUM SERPL-MCNC: 2.3 MG/DL — SIGNIFICANT CHANGE UP (ref 1.8–2.4)
MAGNESIUM SERPL-MCNC: 2.3 MG/DL — SIGNIFICANT CHANGE UP (ref 1.8–2.4)
MCHC RBC-ENTMCNC: 27.8 PG — SIGNIFICANT CHANGE UP (ref 27–31)
MCHC RBC-ENTMCNC: 27.8 PG — SIGNIFICANT CHANGE UP (ref 27–31)
MCHC RBC-ENTMCNC: 33 G/DL — SIGNIFICANT CHANGE UP (ref 32–37)
MCHC RBC-ENTMCNC: 33 G/DL — SIGNIFICANT CHANGE UP (ref 32–37)
MCV RBC AUTO: 84.4 FL — SIGNIFICANT CHANGE UP (ref 81–99)
MCV RBC AUTO: 84.4 FL — SIGNIFICANT CHANGE UP (ref 81–99)
MONOCYTES # BLD AUTO: 0.72 K/UL — HIGH (ref 0.1–0.6)
MONOCYTES # BLD AUTO: 0.72 K/UL — HIGH (ref 0.1–0.6)
MONOCYTES NFR BLD AUTO: 6.3 % — SIGNIFICANT CHANGE UP (ref 1.7–9.3)
MONOCYTES NFR BLD AUTO: 6.3 % — SIGNIFICANT CHANGE UP (ref 1.7–9.3)
NEUTROPHILS # BLD AUTO: 9.27 K/UL — HIGH (ref 1.4–6.5)
NEUTROPHILS # BLD AUTO: 9.27 K/UL — HIGH (ref 1.4–6.5)
NEUTROPHILS NFR BLD AUTO: 80.8 % — HIGH (ref 42.2–75.2)
NEUTROPHILS NFR BLD AUTO: 80.8 % — HIGH (ref 42.2–75.2)
NON HDL CHOLESTEROL: 104 MG/DL — SIGNIFICANT CHANGE UP
NON HDL CHOLESTEROL: 104 MG/DL — SIGNIFICANT CHANGE UP
NRBC # BLD: 0 /100 WBCS — SIGNIFICANT CHANGE UP (ref 0–0)
NRBC # BLD: 0 /100 WBCS — SIGNIFICANT CHANGE UP (ref 0–0)
PHOSPHATE SERPL-MCNC: 3 MG/DL — SIGNIFICANT CHANGE UP (ref 2.1–4.9)
PHOSPHATE SERPL-MCNC: 3 MG/DL — SIGNIFICANT CHANGE UP (ref 2.1–4.9)
PLATELET # BLD AUTO: 287 K/UL — SIGNIFICANT CHANGE UP (ref 130–400)
PLATELET # BLD AUTO: 287 K/UL — SIGNIFICANT CHANGE UP (ref 130–400)
PMV BLD: 10.6 FL — HIGH (ref 7.4–10.4)
PMV BLD: 10.6 FL — HIGH (ref 7.4–10.4)
POTASSIUM SERPL-MCNC: 3.8 MMOL/L — SIGNIFICANT CHANGE UP (ref 3.5–5)
POTASSIUM SERPL-MCNC: 3.8 MMOL/L — SIGNIFICANT CHANGE UP (ref 3.5–5)
POTASSIUM SERPL-SCNC: 3.8 MMOL/L — SIGNIFICANT CHANGE UP (ref 3.5–5)
POTASSIUM SERPL-SCNC: 3.8 MMOL/L — SIGNIFICANT CHANGE UP (ref 3.5–5)
PROT SERPL-MCNC: 6.7 G/DL — SIGNIFICANT CHANGE UP (ref 6–8)
PROT SERPL-MCNC: 6.7 G/DL — SIGNIFICANT CHANGE UP (ref 6–8)
RBC # BLD: 4.67 M/UL — SIGNIFICANT CHANGE UP (ref 4.2–5.4)
RBC # BLD: 4.67 M/UL — SIGNIFICANT CHANGE UP (ref 4.2–5.4)
RBC # FLD: 13.5 % — SIGNIFICANT CHANGE UP (ref 11.5–14.5)
RBC # FLD: 13.5 % — SIGNIFICANT CHANGE UP (ref 11.5–14.5)
SODIUM SERPL-SCNC: 138 MMOL/L — SIGNIFICANT CHANGE UP (ref 135–146)
SODIUM SERPL-SCNC: 138 MMOL/L — SIGNIFICANT CHANGE UP (ref 135–146)
T4 FREE SERPL-MCNC: 1.1 NG/DL — SIGNIFICANT CHANGE UP (ref 0.9–1.8)
T4 FREE SERPL-MCNC: 1.1 NG/DL — SIGNIFICANT CHANGE UP (ref 0.9–1.8)
TRIGL SERPL-MCNC: 53 MG/DL — SIGNIFICANT CHANGE UP
TRIGL SERPL-MCNC: 53 MG/DL — SIGNIFICANT CHANGE UP
TSH SERPL-MCNC: 2.29 UIU/ML — SIGNIFICANT CHANGE UP (ref 0.27–4.2)
TSH SERPL-MCNC: 2.29 UIU/ML — SIGNIFICANT CHANGE UP (ref 0.27–4.2)
WBC # BLD: 11.48 K/UL — HIGH (ref 4.8–10.8)
WBC # BLD: 11.48 K/UL — HIGH (ref 4.8–10.8)
WBC # FLD AUTO: 11.48 K/UL — HIGH (ref 4.8–10.8)
WBC # FLD AUTO: 11.48 K/UL — HIGH (ref 4.8–10.8)

## 2023-12-23 PROCEDURE — 70450 CT HEAD/BRAIN W/O DYE: CPT | Mod: 26

## 2023-12-23 PROCEDURE — 99291 CRITICAL CARE FIRST HOUR: CPT | Mod: GC

## 2023-12-23 RX ORDER — LABETALOL HCL 100 MG
10 TABLET ORAL
Refills: 0 | Status: DISCONTINUED | OUTPATIENT
Start: 2023-12-23 | End: 2023-12-26

## 2023-12-23 RX ORDER — ONDANSETRON 8 MG/1
4 TABLET, FILM COATED ORAL ONCE
Refills: 0 | Status: DISCONTINUED | OUTPATIENT
Start: 2023-12-23 | End: 2023-12-28

## 2023-12-23 RX ORDER — HYDRALAZINE HCL 50 MG
5 TABLET ORAL
Refills: 0 | Status: DISCONTINUED | OUTPATIENT
Start: 2023-12-23 | End: 2023-12-26

## 2023-12-23 RX ORDER — POLYETHYLENE GLYCOL 3350 17 G/17G
17 POWDER, FOR SOLUTION ORAL
Refills: 0 | Status: DISCONTINUED | OUTPATIENT
Start: 2023-12-23 | End: 2023-12-27

## 2023-12-23 RX ORDER — DORZOLAMIDE HYDROCHLORIDE TIMOLOL MALEATE 20; 5 MG/ML; MG/ML
1 SOLUTION/ DROPS OPHTHALMIC EVERY 12 HOURS
Refills: 0 | Status: DISCONTINUED | OUTPATIENT
Start: 2023-12-23 | End: 2023-12-28

## 2023-12-23 RX ADMIN — SENNA PLUS 2 TABLET(S): 8.6 TABLET ORAL at 22:52

## 2023-12-23 RX ADMIN — BRIMONIDINE TARTRATE 1 DROP(S): 2 SOLUTION/ DROPS OPHTHALMIC at 18:43

## 2023-12-23 RX ADMIN — BRIMONIDINE TARTRATE 1 DROP(S): 2 SOLUTION/ DROPS OPHTHALMIC at 05:42

## 2023-12-23 RX ADMIN — POLYETHYLENE GLYCOL 3350 17 GRAM(S): 17 POWDER, FOR SOLUTION ORAL at 18:43

## 2023-12-23 RX ADMIN — Medication 650 MILLIGRAM(S): at 12:30

## 2023-12-23 RX ADMIN — Medication 10 MILLIGRAM(S): at 04:36

## 2023-12-23 RX ADMIN — CHLORHEXIDINE GLUCONATE 1 APPLICATION(S): 213 SOLUTION TOPICAL at 05:44

## 2023-12-23 RX ADMIN — Medication 10 MILLIGRAM(S): at 17:11

## 2023-12-23 RX ADMIN — Medication 650 MILLIGRAM(S): at 12:27

## 2023-12-23 RX ADMIN — LATANOPROST 1 DROP(S): 0.05 SOLUTION/ DROPS OPHTHALMIC; TOPICAL at 22:51

## 2023-12-23 RX ADMIN — PANTOPRAZOLE SODIUM 40 MILLIGRAM(S): 20 TABLET, DELAYED RELEASE ORAL at 06:03

## 2023-12-23 NOTE — PHYSICAL THERAPY INITIAL EVALUATION ADULT - ADDITIONAL COMMENTS
Pt resides with family in a private house using 4 steps to enter and 9 to access 2nd floor. Pt used to be independent with overall functional mobility, able to ambulate without AD at baseline

## 2023-12-23 NOTE — PHYSICAL THERAPY INITIAL EVALUATION ADULT - GAIT TRAINING, PT EVAL
Pt will ambulate using RW or least restrictive AD for 100 ft with supervision by discharge to facilitate return to PLOF. Pt will negotiate 9 steps using 1 HR under  supervision

## 2023-12-23 NOTE — PROGRESS NOTE ADULT - SUBJECTIVE AND OBJECTIVE BOX
SUMMARY:    68F w/ PMHx of GERD, glaucoma presents for L sided numbness and LUE weakness that started this morning at 10am. Pt initially went to urgent care for evaluation, however SBP was measured at 190, 220 on repeat; pt was instructed to go to ED. SBP in 220s in ED. CTH showed 1.3cm R thalamic IPH. Pt denies any AC/AP. Pt given Labetalol IVP and started on Nicardipine gtt.      OVERNIGHT EVENTS:     ADMISSION SCORES:   GCS: 15 ICH Score: 0    SEDATION SCORES:  RASS: CAM-ICU:     REVIEW OF SYSTEMS:    VITALS: [] Reviewed    IMAGING/DATA: [] Reviewed    IVF FLUIDS/MEDICATIONS: [] Reviewed    ALLERGIES: Allergies    No Known Allergies    Intolerances      DEVICES:   [] Restraints [] PIVs [] ET tube [] central line [] PICC [] arterial line [] holder [] NGT/OGT [] EVD [] LD [] ALANA/HMV [] LiCOX [] ICP monitor [] Trach [] PEG [] Chest Tube [] other:    EXAMINATION:  General: No acute distress  HEENT: Anicteric sclerae  Cardiac: [id number]  Lungs: Clear  Abdomen: Soft, non-tender, +BS  Extremities: No c/c/e  Skin/Incision Site: Clean, dry and intact  Neurologic: Awake, alert, fully oriented, follows commands, PERRL, VFFtc, EOMI, face symmetric, tongue midline, no drift, full strength    ICU Vital Signs 24 Hrs  T(C): 36.9 (22 Dec 2023 21:30), Max: 36.9 (22 Dec 2023 21:30)  T(F): 98.4 (22 Dec 2023 21:30), Max: 98.4 (22 Dec 2023 21:30)  HR: 102 (22 Dec 2023 23:55) (93 - 110)  BP: 210/98 (22 Dec 2023 19:15) (130/51 - 244/115)  BP(mean): --  ABP: 156/66 (22 Dec 2023 23:55) (128/54 - 172/82)  ABP(mean): --  RR: 16 (22 Dec 2023 23:55) (16 - 20)  SpO2: 99% (22 Dec 2023 23:55) (98% - 99%)      acetaminophen     Tablet .. 650 milliGRAM(s) Oral every 6 hours PRN  brimonidine 0.2% Ophthalmic Solution 1 Drop(s) Both EYES two times a day  chlorhexidine 2% Cloths 1 Application(s) Topical <User Schedule>  dorzolamide 2%/timolol 0.5% Ophthalmic Solution 1 Drop(s) Both EYES two times a day  latanoprost 0.005% Ophthalmic Solution 1 Drop(s) Both EYES at bedtime  niCARdipine Infusion 5 mG/Hr (25 mL/Hr) IV Continuous <Continuous>  pantoprazole    Tablet 40 milliGRAM(s) Oral before breakfast  senna 2 Tablet(s) Oral at bedtime  sodium chloride 0.9%. 1000 milliLiter(s) (70 mL/Hr) IV Continuous <Continuous>      LABS:  Na: 134 (12-22 @ 16:18)  K: 4.7 (12-22 @ 16:18)  Cl: 99 (12-22 @ 16:18)  CO2: 25 (12-22 @ 16:18)  BUN: 14 (12-22 @ 16:18)  Cr: 0.6 (12-22 @ 16:18)  Glu: 101(12-22 @ 16:18)    Hgb: 14.4 (12-22 @ 16:18)  Hct: 44.5 (12-22 @ 16:18)  WBC: 12.01 (12-22 @ 16:18)  Plt: 299 (12-22 @ 16:18)    INR: 1.00 12-22-23 @ 16:18  PTT: 32.4 12-22-23 @ 16:18          LIVER FUNCTIONS - ( 22 Dec 2023 16:18 )  Alb: 4.4 g/dL / Pro: 7.8 g/dL / ALK PHOS: 110 U/L / ALT: 14 U/L / AST: 24 U/L / GGT: x                SUMMARY:    68F w/ PMHx of GERD, glaucoma presents for L sided numbness and LUE weakness that started this morning at 10am. Pt initially went to urgent care for evaluation, however SBP was measured at 190, 220 on repeat; pt was instructed to go to ED. SBP in 220s in ED. CTH showed 1.3cm R thalamic IPH. Pt denies any AC/AP. Pt given Labetalol IVP and started on Nicardipine gtt.      OVERNIGHT EVENTS:     ADMISSION SCORES:   GCS: 15 ICH Score: 0    SEDATION SCORES:  RASS: CAM-ICU:     REVIEW OF SYSTEMS:    VITALS: [] Reviewed    IMAGING/DATA: [] Reviewed    IVF FLUIDS/MEDICATIONS: [] Reviewed    ALLERGIES: Allergies    No Known Allergies    Intolerances      DEVICES:   [] Restraints [] PIVs [] ET tube [] central line [] PICC [] arterial line [] holder [] NGT/OGT [] EVD [] LD [] ALANA/HMV [] LiCOX [] ICP monitor [] Trach [] PEG [] Chest Tube [] other:    EXAMINATION:  General: No acute distress  HEENT: Anicteric sclerae  Cardiac: [id number]  Lungs: Clear  Abdomen: Soft, non-tender, +BS  Extremities: No c/c/e  Skin/Incision Site: Clean, dry and intact  Neurologic: Awake, alert, fully oriented, follows commands, PERRL, VFFtc, EOMI, face symmetric, tongue midline, no drift, full strength    ICU Vital Signs 24 Hrs  T(C): 36.9 (22 Dec 2023 21:30), Max: 36.9 (22 Dec 2023 21:30)  T(F): 98.4 (22 Dec 2023 21:30), Max: 98.4 (22 Dec 2023 21:30)  HR: 102 (22 Dec 2023 23:55) (93 - 110)  BP: 210/98 (22 Dec 2023 19:15) (130/51 - 244/115)  BP(mean): --  ABP: 156/66 (22 Dec 2023 23:55) (128/54 - 172/82)  ABP(mean): --  RR: 16 (22 Dec 2023 23:55) (16 - 20)  SpO2: 99% (22 Dec 2023 23:55) (98% - 99%)      acetaminophen     Tablet .. 650 milliGRAM(s) Oral every 6 hours PRN  brimonidine 0.2% Ophthalmic Solution 1 Drop(s) Both EYES two times a day  chlorhexidine 2% Cloths 1 Application(s) Topical <User Schedule>  dorzolamide 2%/timolol 0.5% Ophthalmic Solution 1 Drop(s) Both EYES two times a day  latanoprost 0.005% Ophthalmic Solution 1 Drop(s) Both EYES at bedtime  niCARdipine Infusion 5 mG/Hr (25 mL/Hr) IV Continuous <Continuous>  pantoprazole    Tablet 40 milliGRAM(s) Oral before breakfast  senna 2 Tablet(s) Oral at bedtime  sodium chloride 0.9%. 1000 milliLiter(s) (70 mL/Hr) IV Continuous <Continuous>      LABS:  Na: 134 (12-22 @ 16:18)  K: 4.7 (12-22 @ 16:18)  Cl: 99 (12-22 @ 16:18)  CO2: 25 (12-22 @ 16:18)  BUN: 14 (12-22 @ 16:18)  Cr: 0.6 (12-22 @ 16:18)  Glu: 101(12-22 @ 16:18)    Hgb: 14.4 (12-22 @ 16:18)  Hct: 44.5 (12-22 @ 16:18)  WBC: 12.01 (12-22 @ 16:18)  Plt: 299 (12-22 @ 16:18)    INR: 1.00 12-22-23 @ 16:18  PTT: 32.4 12-22-23 @ 16:18          LIVER FUNCTIONS - ( 22 Dec 2023 16:18 )  Alb: 4.4 g/dL / Pro: 7.8 g/dL / ALK PHOS: 110 U/L / ALT: 14 U/L / AST: 24 U/L / GGT: x                SUMMARY:    68F w/ PMHx of GERD, glaucoma presents for L sided numbness and LUE weakness that started this morning at 10am. Pt initially went to urgent care for evaluation, however SBP was measured at 190, 220 on repeat; pt was instructed to go to ED. SBP in 220s in ED. CTH showed 1.3cm R thalamic IPH. Pt denies any AC/AP. Pt given Labetalol IVP and started on Nicardipine gtt.      OVERNIGHT EVENTS: stable exam    ADMISSION SCORES:   GCS: 15 ICH Score: 0    SEDATION SCORES:  RASS: CAM-ICU:     REVIEW OF SYSTEMS:    VITALS: [] Reviewed    IMAGING/DATA: [] Reviewed    IVF FLUIDS/MEDICATIONS: [] Reviewed    ALLERGIES: Allergies    No Known Allergies    Intolerances      DEVICES:   [] Restraints [] PIVs [] ET tube [] central line [] PICC [] arterial line [] holder [] NGT/OGT [] EVD [] LD [] ALANA/HMV [] LiCOX [] ICP monitor [] Trach [] PEG [] Chest Tube [] other:    EXAMINATION:  General: No acute distress  HEENT: Anicteric sclerae  Cardiac: [id number]  Lungs: Clear  Abdomen: Soft, non-tender, +BS  Extremities: No c/c/e  Skin/Incision Site: Clean, dry and intact  Neurologic: Awake, alert, oriented x3, fluent speech, follows on all four, face symmetric, L eye chronic blindness, eomi, L face and arm hemiesthesia, motor strength 5/5 no drift            ICU Vital Signs Last 24 Hrs  T(C): 36.8 (23 Dec 2023 08:00), Max: 36.9 (22 Dec 2023 21:30)  T(F): 98.2 (23 Dec 2023 08:00), Max: 98.4 (22 Dec 2023 21:30)  HR: 88 (23 Dec 2023 10:00) (81 - 110)  BP: 158/79 (23 Dec 2023 01:00) (130/51 - 244/115)  BP(mean): --  ABP: 129/46 (23 Dec 2023 10:00) (128/54 - 191/68)  ABP(mean): 70 (23 Dec 2023 10:00) (70 - 116)  RR: 34 (23 Dec 2023 10:00) (14 - 43)  SpO2: 98% (23 Dec 2023 10:00) (95% - 99%)      12-22-23 @ 07:01  -  12-23-23 @ 07:00  --------------------------------------------------------  IN: 620 mL / OUT: 0 mL / NET: 620 mL    12-23-23 @ 07:01  -  12-23-23 @ 12:05  --------------------------------------------------------  IN: 0 mL / OUT: 800 mL / NET: -800 mL            acetaminophen     Tablet .. 650 milliGRAM(s) Oral every 6 hours PRN  brimonidine 0.2% Ophthalmic Solution 1 Drop(s) Both EYES two times a day  chlorhexidine 2% Cloths 1 Application(s) Topical <User Schedule>  dorzolamide 2%/timolol 0.5% Ophthalmic Solution 1 Drop(s) Both EYES two times a day  hydrALAZINE Injectable 5 milliGRAM(s) IV Push every 2 hours PRN  labetalol Injectable 10 milliGRAM(s) IV Push every 2 hours PRN  latanoprost 0.005% Ophthalmic Solution 1 Drop(s) Both EYES at bedtime  niCARdipine Infusion 5 mG/Hr (25 mL/Hr) IV Continuous <Continuous>  pantoprazole    Tablet 40 milliGRAM(s) Oral before breakfast  senna 2 Tablet(s) Oral at bedtime      LABS:  Na: 138 (12-23 @ 06:18), 134 (12-22 @ 16:18)  K: 3.8 (12-23 @ 06:18), 4.7 (12-22 @ 16:18)  Cl: 106 (12-23 @ 06:18), 99 (12-22 @ 16:18)  CO2: 22 (12-23 @ 06:18), 25 (12-22 @ 16:18)  BUN: 9 (12-23 @ 06:18), 14 (12-22 @ 16:18)  Cr: <0.5 (12-23 @ 06:18), 0.6 (12-22 @ 16:18)  Glu: 118(12-23 @ 06:18), 101(12-22 @ 16:18)    Hgb: 13.0 (12-23 @ 06:18), 14.4 (12-22 @ 16:18)  Hct: 39.4 (12-23 @ 06:18), 44.5 (12-22 @ 16:18)  WBC: 11.48 (12-23 @ 06:18), 12.01 (12-22 @ 16:18)  Plt: 287 (12-23 @ 06:18), 299 (12-22 @ 16:18)    INR: 1.00 12-22-23 @ 16:18  PTT: 32.4 12-22-23 @ 16:18          LIVER FUNCTIONS - ( 23 Dec 2023 06:18 )  Alb: 4.0 g/dL / Pro: 6.7 g/dL / ALK PHOS: 95 U/L / ALT: 12 U/L / AST: 13 U/L / GGT: x                    SUMMARY:    68F w/ PMHx of GERD, glaucoma presents for L sided numbness and LUE weakness that started this morning at 10am. Pt initially went to urgent care for evaluation, however SBP was measured at 190, 220 on repeat; pt was instructed to go to ED. SBP in 220s in ED. CTH showed 1.3cm R thalamic IPH. Pt denies any AC/AP. Pt given Labetalol IVP and started on Nicardipine gtt.      OVERNIGHT EVENTS: stable exam    ADMISSION SCORES:   GCS: 15 ICH Score: 0    SEDATION SCORES:  RASS: CAM-ICU:     REVIEW OF SYSTEMS:    VITALS: [] Reviewed    IMAGING/DATA: [] Reviewed    IVF FLUIDS/MEDICATIONS: [] Reviewed    ALLERGIES: Allergies    No Known Allergies    Intolerances      DEVICES:   [] Restraints [] PIVs [] ET tube [] central line [] PICC [] arterial line [] holder [] NGT/OGT [] EVD [] LD [] ALANA/HMV [] LiCOX [] ICP monitor [] Trach [] PEG [] Chest Tube [] other:    EXAMINATION:  General: No acute distress  HEENT: Anicteric sclerae  Cardiac: [id number]  Lungs: Clear  Abdomen: Soft, non-tender, +BS  Extremities: No c/c/e  Skin/Incision Site: Clean, dry and intact  Neurologic: Awake, alert, oriented x3, fluent speech, follows on all four, face symmetric, L eye chronic blindness, eomi, L face and arm hemiesthesia, motor strength 5/5 no drift            ICU Vital Signs Last 24 Hrs  T(C): 36.8 (23 Dec 2023 08:00), Max: 36.9 (22 Dec 2023 21:30)  T(F): 98.2 (23 Dec 2023 08:00), Max: 98.4 (22 Dec 2023 21:30)  HR: 88 (23 Dec 2023 10:00) (81 - 110)  BP: 158/79 (23 Dec 2023 01:00) (130/51 - 244/115)  BP(mean): --  ABP: 129/46 (23 Dec 2023 10:00) (128/54 - 191/68)  ABP(mean): 70 (23 Dec 2023 10:00) (70 - 116)  RR: 34 (23 Dec 2023 10:00) (14 - 43)  SpO2: 98% (23 Dec 2023 10:00) (95% - 99%)      12-22-23 @ 07:01  -  12-23-23 @ 07:00  --------------------------------------------------------  IN: 620 mL / OUT: 0 mL / NET: 620 mL    12-23-23 @ 07:01  -  12-23-23 @ 12:05  --------------------------------------------------------  IN: 0 mL / OUT: 800 mL / NET: -800 mL            acetaminophen     Tablet .. 650 milliGRAM(s) Oral every 6 hours PRN  brimonidine 0.2% Ophthalmic Solution 1 Drop(s) Both EYES two times a day  chlorhexidine 2% Cloths 1 Application(s) Topical <User Schedule>  dorzolamide 2%/timolol 0.5% Ophthalmic Solution 1 Drop(s) Both EYES two times a day  hydrALAZINE Injectable 5 milliGRAM(s) IV Push every 2 hours PRN  labetalol Injectable 10 milliGRAM(s) IV Push every 2 hours PRN  latanoprost 0.005% Ophthalmic Solution 1 Drop(s) Both EYES at bedtime  niCARdipine Infusion 5 mG/Hr (25 mL/Hr) IV Continuous <Continuous>  pantoprazole    Tablet 40 milliGRAM(s) Oral before breakfast  senna 2 Tablet(s) Oral at bedtime      LABS:  Na: 138 (12-23 @ 06:18), 134 (12-22 @ 16:18)  K: 3.8 (12-23 @ 06:18), 4.7 (12-22 @ 16:18)  Cl: 106 (12-23 @ 06:18), 99 (12-22 @ 16:18)  CO2: 22 (12-23 @ 06:18), 25 (12-22 @ 16:18)  BUN: 9 (12-23 @ 06:18), 14 (12-22 @ 16:18)  Cr: <0.5 (12-23 @ 06:18), 0.6 (12-22 @ 16:18)  Glu: 118(12-23 @ 06:18), 101(12-22 @ 16:18)    Hgb: 13.0 (12-23 @ 06:18), 14.4 (12-22 @ 16:18)  Hct: 39.4 (12-23 @ 06:18), 44.5 (12-22 @ 16:18)  WBC: 11.48 (12-23 @ 06:18), 12.01 (12-22 @ 16:18)  Plt: 287 (12-23 @ 06:18), 299 (12-22 @ 16:18)    INR: 1.00 12-22-23 @ 16:18  PTT: 32.4 12-22-23 @ 16:18          LIVER FUNCTIONS - ( 23 Dec 2023 06:18 )  Alb: 4.0 g/dL / Pro: 6.7 g/dL / ALK PHOS: 95 U/L / ALT: 12 U/L / AST: 13 U/L / GGT: x

## 2023-12-23 NOTE — PATIENT PROFILE ADULT - FALL HARM RISK - HARM RISK INTERVENTIONS
Assistance with ambulation/Assistance OOB with selected safe patient handling equipment/Communicate Risk of Fall with Harm to all staff/Discuss with provider need for PT consult/Monitor gait and stability/Provide patient with walking aids - walker, cane, crutches/Reinforce activity limits and safety measures with patient and family/Tailored Fall Risk Interventions/Visual Cue: Yellow wristband and red socks/Bed in lowest position, wheels locked, appropriate side rails in place/Call bell, personal items and telephone in reach/Instruct patient to call for assistance before getting out of bed or chair/Non-slip footwear when patient is out of bed/[place] to call system/Physically safe environment - no spills, clutter or unnecessary equipment/Purposeful Proactive Rounding/Room/bathroom lighting operational, light cord in reach Assistance with ambulation/Assistance OOB with selected safe patient handling equipment/Communicate Risk of Fall with Harm to all staff/Discuss with provider need for PT consult/Monitor gait and stability/Provide patient with walking aids - walker, cane, crutches/Reinforce activity limits and safety measures with patient and family/Tailored Fall Risk Interventions/Visual Cue: Yellow wristband and red socks/Bed in lowest position, wheels locked, appropriate side rails in place/Call bell, personal items and telephone in reach/Instruct patient to call for assistance before getting out of bed or chair/Non-slip footwear when patient is out of bed/[place] to call system/Physically safe environment - no spills, clutter or unnecessary equipment/Purposeful Proactive Rounding/Room/bathroom lighting operational, light cord in reach

## 2023-12-23 NOTE — PROGRESS NOTE ADULT - ASSESSMENT
68F w/ PMHx of GERD, glaucoma admitted with R thalamic IPH (ICH 0), hypertensive emergency to sbp 220-240    NEURO:  q2h neurochecks  CTH today for stability  CTA negative  Activity: [x]edge of bed [] Bedrest [x] PT [x] OT [] PMNR    PULM:  Incentive spirometry, mobilize as tolerated  SpO2>94%  HOB>30  Aspiration precautions    CV:  pt 160-180 o/n; will now deescalate to 140-160 this afternoon   Nicardipine gtt prn  EKG  TTE    RENAL:  IVL  Monitor I&Os    GI:  Diet: dash diet  GI prophylaxis [] not indicated [x] PPI- home med [] pepcid  Bowel regimen [] miralax [x] senna [] other:    ENDO:   Goal -180  a1c  tsh    HEME/ONC:  VTE prophylaxis: [x] SCDs [] chemoprophylaxis [x] hold chemoprophylaxis due to: ICH    ID:  Maintain normothermia    MISC:    SOCIAL/FAMILY:  [] awaiting [x] updated at bedside [] family meeting    CODE STATUS:  [x] Full Code [] DNR [] DNI [] Palliative/Comfort Care    DISPOSITION:  [x] ICU [] Stroke Unit [] Floor [] CEU [] Tele

## 2023-12-23 NOTE — PHYSICAL THERAPY INITIAL EVALUATION ADULT - PERTINENT HX OF CURRENT PROBLEM, REHAB EVAL
68F w/ PMHx of GERD, glaucoma presents for L sided numbness and LUE weakness that started this morning at 10am. Pt initially went to urgent care for evaluation, however SBP was measured at 190, 220 on repeat; pt was instructed to go to ED. SBP in 220s in ED. CTH showed 1.3cm R thalamic IPH. Pt denies any AC/AP. Pt given Labetalol IVP and started on Nicardipine gtt.

## 2023-12-23 NOTE — CHART NOTE - NSCHARTNOTEFT_GEN_A_CORE
Neurosurgery Chart Note    Case and images reviewed.  No acute Neurosurgical intervention, continue hemodynamic control and serial neurological checks, care per NeuroICU.     Images reviewed  Hemodynamic control per Neuro ICU  No acute Neurosurgical intervention  Call Neurosurgery and stat CTH for any changes in neurological exam  Neurosurgery Sign off, please reconsult PRN    Case and care discussed with Dr Dinero

## 2023-12-23 NOTE — PATIENT PROFILE ADULT - VISION (WITH CORRECTIVE LENSES IF THE PATIENT USUALLY WEARS THEM):
Left eye blindness and glaucoma/Partially impaired: cannot see medication labels or newsprint, but can see obstacles in path, and the surrounding layout; can count fingers at arm's length

## 2023-12-23 NOTE — PATIENT PROFILE ADULT - FUNCTIONAL ASSESSMENT - BASIC MOBILITY 2.
Spoke to patient and provided a [facility] billing number, so he can ask questions pertaining to his bill.  
3 = A little assistance

## 2023-12-23 NOTE — PHYSICAL THERAPY INITIAL EVALUATION ADULT - GENERAL OBSERVATIONS, REHAB EVAL
11:10-11:40. chart reviewed. Pt received semi-magallon at B/S, alert, oriented X 2, able to follow one step instructions and agreeable to PT evaluation.  Pt is Japanese speaking ID [id number]. + IV, + a-line, + primafit, Not on gtt. VSS, initial vitals supine 137/66 HR 96, sitting 143/90 HR 99, standing 137/85 .  denies dizziness or discomfort, NAD, 11:10-11:40. chart reviewed. Pt received semi-magallon at B/S, alert, oriented X 2, able to follow one step instructions and agreeable to PT evaluation.  Pt is [place]ic speaking ID [id number]. + IV, + a-line, + primafit, Not on gtt. VSS, initial vitals supine 137/66 HR 96, sitting 143/90 HR 99, standing 137/85 .  denies dizziness or discomfort, NAD,

## 2023-12-24 LAB
ALBUMIN SERPL ELPH-MCNC: 3.8 G/DL — SIGNIFICANT CHANGE UP (ref 3.5–5.2)
ALBUMIN SERPL ELPH-MCNC: 3.8 G/DL — SIGNIFICANT CHANGE UP (ref 3.5–5.2)
ALP SERPL-CCNC: 87 U/L — SIGNIFICANT CHANGE UP (ref 30–115)
ALP SERPL-CCNC: 87 U/L — SIGNIFICANT CHANGE UP (ref 30–115)
ALT FLD-CCNC: 11 U/L — SIGNIFICANT CHANGE UP (ref 0–41)
ALT FLD-CCNC: 11 U/L — SIGNIFICANT CHANGE UP (ref 0–41)
ANION GAP SERPL CALC-SCNC: 11 MMOL/L — SIGNIFICANT CHANGE UP (ref 7–14)
ANION GAP SERPL CALC-SCNC: 11 MMOL/L — SIGNIFICANT CHANGE UP (ref 7–14)
AST SERPL-CCNC: 16 U/L — SIGNIFICANT CHANGE UP (ref 0–41)
AST SERPL-CCNC: 16 U/L — SIGNIFICANT CHANGE UP (ref 0–41)
BILIRUB SERPL-MCNC: 0.5 MG/DL — SIGNIFICANT CHANGE UP (ref 0.2–1.2)
BILIRUB SERPL-MCNC: 0.5 MG/DL — SIGNIFICANT CHANGE UP (ref 0.2–1.2)
BUN SERPL-MCNC: 14 MG/DL — SIGNIFICANT CHANGE UP (ref 10–20)
BUN SERPL-MCNC: 14 MG/DL — SIGNIFICANT CHANGE UP (ref 10–20)
CALCIUM SERPL-MCNC: 8.7 MG/DL — SIGNIFICANT CHANGE UP (ref 8.4–10.4)
CALCIUM SERPL-MCNC: 8.7 MG/DL — SIGNIFICANT CHANGE UP (ref 8.4–10.4)
CHLORIDE SERPL-SCNC: 106 MMOL/L — SIGNIFICANT CHANGE UP (ref 98–110)
CHLORIDE SERPL-SCNC: 106 MMOL/L — SIGNIFICANT CHANGE UP (ref 98–110)
CO2 SERPL-SCNC: 24 MMOL/L — SIGNIFICANT CHANGE UP (ref 17–32)
CO2 SERPL-SCNC: 24 MMOL/L — SIGNIFICANT CHANGE UP (ref 17–32)
CREAT SERPL-MCNC: 0.6 MG/DL — LOW (ref 0.7–1.5)
CREAT SERPL-MCNC: 0.6 MG/DL — LOW (ref 0.7–1.5)
EGFR: 98 ML/MIN/1.73M2 — SIGNIFICANT CHANGE UP
EGFR: 98 ML/MIN/1.73M2 — SIGNIFICANT CHANGE UP
GLUCOSE SERPL-MCNC: 94 MG/DL — SIGNIFICANT CHANGE UP (ref 70–99)
GLUCOSE SERPL-MCNC: 94 MG/DL — SIGNIFICANT CHANGE UP (ref 70–99)
HCT VFR BLD CALC: 40.8 % — SIGNIFICANT CHANGE UP (ref 37–47)
HCT VFR BLD CALC: 40.8 % — SIGNIFICANT CHANGE UP (ref 37–47)
HGB BLD-MCNC: 13 G/DL — SIGNIFICANT CHANGE UP (ref 12–16)
HGB BLD-MCNC: 13 G/DL — SIGNIFICANT CHANGE UP (ref 12–16)
MAGNESIUM SERPL-MCNC: 2.3 MG/DL — SIGNIFICANT CHANGE UP (ref 1.8–2.4)
MAGNESIUM SERPL-MCNC: 2.3 MG/DL — SIGNIFICANT CHANGE UP (ref 1.8–2.4)
MCHC RBC-ENTMCNC: 27.5 PG — SIGNIFICANT CHANGE UP (ref 27–31)
MCHC RBC-ENTMCNC: 27.5 PG — SIGNIFICANT CHANGE UP (ref 27–31)
MCHC RBC-ENTMCNC: 31.9 G/DL — LOW (ref 32–37)
MCHC RBC-ENTMCNC: 31.9 G/DL — LOW (ref 32–37)
MCV RBC AUTO: 86.3 FL — SIGNIFICANT CHANGE UP (ref 81–99)
MCV RBC AUTO: 86.3 FL — SIGNIFICANT CHANGE UP (ref 81–99)
NRBC # BLD: 0 /100 WBCS — SIGNIFICANT CHANGE UP (ref 0–0)
NRBC # BLD: 0 /100 WBCS — SIGNIFICANT CHANGE UP (ref 0–0)
PHOSPHATE SERPL-MCNC: 3.6 MG/DL — SIGNIFICANT CHANGE UP (ref 2.1–4.9)
PHOSPHATE SERPL-MCNC: 3.6 MG/DL — SIGNIFICANT CHANGE UP (ref 2.1–4.9)
PLATELET # BLD AUTO: 279 K/UL — SIGNIFICANT CHANGE UP (ref 130–400)
PLATELET # BLD AUTO: 279 K/UL — SIGNIFICANT CHANGE UP (ref 130–400)
PMV BLD: 10.9 FL — HIGH (ref 7.4–10.4)
PMV BLD: 10.9 FL — HIGH (ref 7.4–10.4)
POTASSIUM SERPL-MCNC: 3.9 MMOL/L — SIGNIFICANT CHANGE UP (ref 3.5–5)
POTASSIUM SERPL-MCNC: 3.9 MMOL/L — SIGNIFICANT CHANGE UP (ref 3.5–5)
POTASSIUM SERPL-SCNC: 3.9 MMOL/L — SIGNIFICANT CHANGE UP (ref 3.5–5)
POTASSIUM SERPL-SCNC: 3.9 MMOL/L — SIGNIFICANT CHANGE UP (ref 3.5–5)
PROT SERPL-MCNC: 6.6 G/DL — SIGNIFICANT CHANGE UP (ref 6–8)
PROT SERPL-MCNC: 6.6 G/DL — SIGNIFICANT CHANGE UP (ref 6–8)
RAPID RVP RESULT: DETECTED
RAPID RVP RESULT: DETECTED
RBC # BLD: 4.73 M/UL — SIGNIFICANT CHANGE UP (ref 4.2–5.4)
RBC # BLD: 4.73 M/UL — SIGNIFICANT CHANGE UP (ref 4.2–5.4)
RBC # FLD: 13.9 % — SIGNIFICANT CHANGE UP (ref 11.5–14.5)
RBC # FLD: 13.9 % — SIGNIFICANT CHANGE UP (ref 11.5–14.5)
RV+EV RNA SPEC QL NAA+PROBE: DETECTED
RV+EV RNA SPEC QL NAA+PROBE: DETECTED
SARS-COV-2 RNA SPEC QL NAA+PROBE: SIGNIFICANT CHANGE UP
SARS-COV-2 RNA SPEC QL NAA+PROBE: SIGNIFICANT CHANGE UP
SODIUM SERPL-SCNC: 141 MMOL/L — SIGNIFICANT CHANGE UP (ref 135–146)
SODIUM SERPL-SCNC: 141 MMOL/L — SIGNIFICANT CHANGE UP (ref 135–146)
WBC # BLD: 9.25 K/UL — SIGNIFICANT CHANGE UP (ref 4.8–10.8)
WBC # BLD: 9.25 K/UL — SIGNIFICANT CHANGE UP (ref 4.8–10.8)
WBC # FLD AUTO: 9.25 K/UL — SIGNIFICANT CHANGE UP (ref 4.8–10.8)
WBC # FLD AUTO: 9.25 K/UL — SIGNIFICANT CHANGE UP (ref 4.8–10.8)

## 2023-12-24 PROCEDURE — 93010 ELECTROCARDIOGRAM REPORT: CPT

## 2023-12-24 PROCEDURE — 93306 TTE W/DOPPLER COMPLETE: CPT | Mod: 26

## 2023-12-24 PROCEDURE — 99233 SBSQ HOSP IP/OBS HIGH 50: CPT | Mod: GC

## 2023-12-24 RX ORDER — BENZOCAINE 10 %
1 GEL (GRAM) MUCOUS MEMBRANE EVERY 6 HOURS
Refills: 0 | Status: DISCONTINUED | OUTPATIENT
Start: 2023-12-24 | End: 2023-12-28

## 2023-12-24 RX ORDER — ENOXAPARIN SODIUM 100 MG/ML
40 INJECTION SUBCUTANEOUS EVERY 24 HOURS
Refills: 0 | Status: DISCONTINUED | OUTPATIENT
Start: 2023-12-24 | End: 2023-12-28

## 2023-12-24 RX ORDER — MAGNESIUM HYDROXIDE 400 MG/1
30 TABLET, CHEWABLE ORAL DAILY
Refills: 0 | Status: DISCONTINUED | OUTPATIENT
Start: 2023-12-24 | End: 2023-12-25

## 2023-12-24 RX ORDER — LABETALOL HCL 100 MG
100 TABLET ORAL EVERY 12 HOURS
Refills: 0 | Status: DISCONTINUED | OUTPATIENT
Start: 2023-12-24 | End: 2023-12-26

## 2023-12-24 RX ORDER — BENZOCAINE AND MENTHOL 5; 1 G/100ML; G/100ML
1 LIQUID ORAL EVERY 6 HOURS
Refills: 0 | Status: DISCONTINUED | OUTPATIENT
Start: 2023-12-24 | End: 2023-12-24

## 2023-12-24 RX ORDER — POTASSIUM CHLORIDE 20 MEQ
20 PACKET (EA) ORAL ONCE
Refills: 0 | Status: COMPLETED | OUTPATIENT
Start: 2023-12-24 | End: 2023-12-24

## 2023-12-24 RX ORDER — HEPARIN SODIUM 5000 [USP'U]/ML
5000 INJECTION INTRAVENOUS; SUBCUTANEOUS EVERY 8 HOURS
Refills: 0 | Status: DISCONTINUED | OUTPATIENT
Start: 2023-12-24 | End: 2023-12-24

## 2023-12-24 RX ADMIN — Medication 5 MILLIGRAM(S): at 13:06

## 2023-12-24 RX ADMIN — ENOXAPARIN SODIUM 40 MILLIGRAM(S): 100 INJECTION SUBCUTANEOUS at 17:44

## 2023-12-24 RX ADMIN — HEPARIN SODIUM 5000 UNIT(S): 5000 INJECTION INTRAVENOUS; SUBCUTANEOUS at 06:33

## 2023-12-24 RX ADMIN — BRIMONIDINE TARTRATE 1 DROP(S): 2 SOLUTION/ DROPS OPHTHALMIC at 05:33

## 2023-12-24 RX ADMIN — MAGNESIUM HYDROXIDE 30 MILLILITER(S): 400 TABLET, CHEWABLE ORAL at 12:36

## 2023-12-24 RX ADMIN — Medication 20 MILLIEQUIVALENT(S): at 06:33

## 2023-12-24 RX ADMIN — CHLORHEXIDINE GLUCONATE 1 APPLICATION(S): 213 SOLUTION TOPICAL at 05:33

## 2023-12-24 RX ADMIN — Medication 10 MILLIGRAM(S): at 03:40

## 2023-12-24 RX ADMIN — Medication 100 MILLIGRAM(S): at 17:44

## 2023-12-24 RX ADMIN — Medication 5 MILLIGRAM(S): at 05:34

## 2023-12-24 RX ADMIN — PANTOPRAZOLE SODIUM 40 MILLIGRAM(S): 20 TABLET, DELAYED RELEASE ORAL at 06:11

## 2023-12-24 RX ADMIN — POLYETHYLENE GLYCOL 3350 17 GRAM(S): 17 POWDER, FOR SOLUTION ORAL at 05:34

## 2023-12-24 NOTE — CHART NOTE - NSCHARTNOTEFT_GEN_A_CORE
NSICU Transfer Note    NSICU ---> STROKE UNIT/CEU (Rhinovirus (+))    Accepting Physician: Randolph    Signout given to:     HPI / CCU COURSE: 68F w/ PMHx of GERD, glaucoma presents for L sided numbness and LUE weakness that started this morning at 10am. Pt initially went to urgent care for evaluation, however SBP was measured at 190, 220 on repeat; pt was instructed to go to ED. SBP in 220s in ED. CTH showed 1.3cm R thalamic IPH. Pt denies any AC/AP. Pt given Labetalol IVP and started on Nicardipine gtt. While in NSICU arterial line was placed for treatment of hypertensive emergency. Repeat CTH showed stability. Pt was titrated off of cardene while maintaining a stable neurological exam. Pt transitioned to PO labetolol for HTN.           Vital Signs Last 24 Hrs  T(C): 36.2 (24 Dec 2023 16:00), Max: 37.1 (23 Dec 2023 20:00)  T(F): 97.1 (24 Dec 2023 16:00), Max: 98.7 (23 Dec 2023 20:00)  HR: 88 (24 Dec 2023 17:00) (79 - 104)  BP: 140/68 (24 Dec 2023 17:00) (98/51 - 197/89)  BP(mean): 92 (24 Dec 2023 17:00) (68 - 128)  RR: 25 (24 Dec 2023 17:00) (15 - 37)  SpO2: 98% (24 Dec 2023 17:00) (95% - 99%)    Parameters below as of 24 Dec 2023 16:00  Patient On (Oxygen Delivery Method): room air        I&O's Summary    23 Dec 2023 07:01  -  24 Dec 2023 07:00  --------------------------------------------------------  IN: 200 mL / OUT: 2000 mL / NET: -1800 mL    24 Dec 2023 07:01  -  24 Dec 2023 17:45  --------------------------------------------------------  IN: 720 mL / OUT: 600 mL / NET: 120 mL        Physical Exam:   General: No acute distress  HEENT: Anicteric sclerae  Cardiac: [id number]  Lungs: Clear  Abdomen: Soft, non-tender, +BS  Extremities: No c/c/e  Skin/Incision Site: Clean, dry and intact  Neurologic: Awake, alert, oriented x3, fluent speech, follows on all four, face symmetric, L eye chronic blindness, eomi, L face and arm hemiesthesia, motor strength 5/5 no drift      LABS:                               13.0   9.25  )-----------( 279      ( 24 Dec 2023 04:40 )             40.8       12-24    141  |  106  |  14  ----------------------------<  94  3.9   |  24  |  0.6<L>    Ca    8.7      24 Dec 2023 04:40  Phos  3.6     12-24  Mg     2.3     12-24    TPro  6.6  /  Alb  3.8  /  TBili  0.5  /  DBili  x   /  AST  16  /  ALT  11  /  AlkPhos  87  12-24              ASSESSMENT & PLAN:     #IPH  #Hypertensive Emergency  #    · Assessment	  68F w/ PMHx of GERD, glaucoma admitted with R thalamic IPH (ICH 0), hypertensive emergency to sbp 220-240    NEURO:  #IPH  q4h neurochecks  repeat CTH stable  CTA negative  stroke labs as documented  Activity: [x]edge of bed [] Bedrest [x] PT [x] OT [] PMNR      CV:  #Hypertensive Emergency  pt 100-140   Labetolol 100mg PO BID  f/u TTE      HEME/ONC:  VTE prophylaxis: [x] SCDs [x] chemoprophylaxis lovenox tonight          FOR FOLLOW UP:  [ ] MRI  [ ] PT/OT  [ ] SBP control NSICU Transfer Note    NSICU ---> STROKE UNIT/CEU (Rhinovirus (+))    Accepting Physician: Randolph    Signout given to:     HPI / CCU COURSE: 68F w/ PMHx of GERD, glaucoma presents for L sided numbness and LUE weakness that started this morning at 10am. Pt initially went to urgent care for evaluation, however SBP was measured at 190, 220 on repeat; pt was instructed to go to ED. SBP in 220s in ED. CTH showed 1.3cm R thalamic IPH. Pt denies any AC/AP. Pt given Labetalol IVP and started on Nicardipine gtt. While in NSICU arterial line was placed for treatment of hypertensive emergency. Repeat CTH showed stability. Pt was titrated off of cardene while maintaining a stable neurological exam. Pt transitioned to PO labetolol for HTN.           Vital Signs Last 24 Hrs  T(C): 36.2 (24 Dec 2023 16:00), Max: 37.1 (23 Dec 2023 20:00)  T(F): 97.1 (24 Dec 2023 16:00), Max: 98.7 (23 Dec 2023 20:00)  HR: 88 (24 Dec 2023 17:00) (79 - 104)  BP: 140/68 (24 Dec 2023 17:00) (98/51 - 197/89)  BP(mean): 92 (24 Dec 2023 17:00) (68 - 128)  RR: 25 (24 Dec 2023 17:00) (15 - 37)  SpO2: 98% (24 Dec 2023 17:00) (95% - 99%)    Parameters below as of 24 Dec 2023 16:00  Patient On (Oxygen Delivery Method): room air        I&O's Summary    23 Dec 2023 07:01  -  24 Dec 2023 07:00  --------------------------------------------------------  IN: 200 mL / OUT: 2000 mL / NET: -1800 mL    24 Dec 2023 07:01  -  24 Dec 2023 17:45  --------------------------------------------------------  IN: 720 mL / OUT: 600 mL / NET: 120 mL        Physical Exam:   General: No acute distress  HEENT: Anicteric sclerae  Cardiac: [id number]  Lungs: Clear  Abdomen: Soft, non-tender, +BS  Extremities: No c/c/e  Skin/Incision Site: Clean, dry and intact  Neurologic: Awake, alert, oriented x3, fluent speech, follows on all four, face symmetric, L eye chronic blindness, eomi, L face and arm hemiesthesia, motor strength 5/5 no drift      LABS:                               13.0   9.25  )-----------( 279      ( 24 Dec 2023 04:40 )             40.8       12-24    141  |  106  |  14  ----------------------------<  94  3.9   |  24  |  0.6<L>    Ca    8.7      24 Dec 2023 04:40  Phos  3.6     12-24  Mg     2.3     12-24    TPro  6.6  /  Alb  3.8  /  TBili  0.5  /  DBili  x   /  AST  16  /  ALT  11  /  AlkPhos  87  12-24              ASSESSMENT & PLAN:     #IPH  #Hypertensive Emergency  #    · Assessment	  68F w/ PMHx of GERD, glaucoma admitted with R thalamic IPH (ICH 0), hypertensive emergency to sbp 220-240    NEURO:  #IPH  q4h neurochecks  repeat CTH stable  CTA negative  stroke labs as documented  Activity: [x]edge of bed [] Bedrest [x] PT [x] OT [] PMNR      CV:  #Hypertensive Emergency  pt 100-140   Labetolol 100mg PO BID  f/u TTE      HEME/ONC:  VTE prophylaxis: [x] SCDs [x] chemoprophylaxis lovenox tonight          FOR FOLLOW UP:  [ ] MRI  [ ] PT/OT  [ ] SBP control NSICU Transfer Note    NSICU ---> Medical floor (Rhinovirus (+))    Accepting Physician: Randolph    Signout given to:     HPI / CCU COURSE: 68F w/ PMHx of GERD, glaucoma presents for L sided numbness and LUE weakness that started on day of admission. Pt initially went to urgent care for evaluation, however SBP was measured at 190, 220 on repeat; pt was instructed to go to ED. SBP in 220s in ED. CTH showed 1.3cm R thalamic IPH. Pt denies any AC/AP. Pt given Labetalol IVP and started on Nicardipine gtt. While in NSICU arterial line was placed for treatment of hypertensive emergency. Repeat CTH showed stability. Pt was titrated off of cardene while maintaining a stable neurological exam. Pt transitioned to PO labetolol for HTN.       Vital Signs Last 24 Hrs  T(C): 36.2 (24 Dec 2023 16:00), Max: 37.1 (23 Dec 2023 20:00)  T(F): 97.1 (24 Dec 2023 16:00), Max: 98.7 (23 Dec 2023 20:00)  HR: 88 (24 Dec 2023 17:00) (79 - 104)  BP: 140/68 (24 Dec 2023 17:00) (98/51 - 197/89)  BP(mean): 92 (24 Dec 2023 17:00) (68 - 128)  RR: 25 (24 Dec 2023 17:00) (15 - 37)  SpO2: 98% (24 Dec 2023 17:00) (95% - 99%)    Parameters below as of 24 Dec 2023 16:00  Patient On (Oxygen Delivery Method): room air        I&O's Summary    23 Dec 2023 07:01  -  24 Dec 2023 07:00  --------------------------------------------------------  IN: 200 mL / OUT: 2000 mL / NET: -1800 mL    24 Dec 2023 07:01  -  24 Dec 2023 17:45  --------------------------------------------------------  IN: 720 mL / OUT: 600 mL / NET: 120 mL        Physical Exam:   General: No acute distress  HEENT: Anicteric sclerae  Cardiac: [id number]  Lungs: Clear  Abdomen: Soft, non-tender, +BS  Extremities: No c/c/e  Skin/Incision Site: Clean, dry and intact  Neurologic: Awake, alert, oriented x3, fluent speech, follows on all four, face symmetric, L eye chronic blindness, eomi, L face and arm hemiesthesia, motor strength 5/5 no drift      LABS:                               13.0   9.25  )-----------( 279      ( 24 Dec 2023 04:40 )             40.8       12-24    141  |  106  |  14  ----------------------------<  94  3.9   |  24  |  0.6<L>    Ca    8.7      24 Dec 2023 04:40  Phos  3.6     12-24  Mg     2.3     12-24    TPro  6.6  /  Alb  3.8  /  TBili  0.5  /  DBili  x   /  AST  16  /  ALT  11  /  AlkPhos  87  12-24              ASSESSMENT & PLAN:     #IPH  #Hypertensive Emergency  #    · Assessment	  68F w/ PMHx of GERD, glaucoma admitted with R thalamic IPH (ICH 0), hypertensive emergency to sbp 220-240    NEURO:  #IPH  q4h neurochecks  repeat CTH stable  CTA negative  stroke labs as documented  Activity: [x]edge of bed [] Bedrest [x] PT [x] OT [] PMNR      CV:  #Hypertensive Emergency  pt 100-140   Labetolol 100mg PO BID  Amlodipine added 12/25  TTE< from: TTE Echo Complete w/o Contrast w/ Doppler (12.24.23 @ 13:38) >     1. Hyperdynamic global left ventricular systolic function with a biplane   EF of 68%. Mild (grade I) diastolic dysfunction.   2. Normal right ventricular size and function.   3. Normal left atrial size.   4. Mild mitral valve regurgitation.   5. Adequate TR velocity was not obtained to accurately assess RVSP.   6. There is no evidence of pericardial effusion.    < end of copied text >          HEME/ONC:  VTE prophylaxis: [x] SCDs [x] chemoprophylaxis lovenox tonight          FOR FOLLOW UP:  [ ] MRI  [ ] PT/OT  [ ] SBP control - transition off PO labetalol to mono therapy with Amlodipine NSICU Transfer Note    NSICU ---> Medical floor (Rhinovirus (+))    Accepting Physician: Randolph    Signout given to:     HPI / CCU COURSE: 68F w/ PMHx of GERD, glaucoma presents for L sided numbness and LUE weakness that started on day of admission. Pt initially went to urgent care for evaluation, however SBP was measured at 190, 220 on repeat; pt was instructed to go to ED. SBP in 220s in ED. CTH showed 1.3cm R thalamic IPH. Pt denies any AC/AP. Pt given Labetalol IVP and started on Nicardipine gtt. While in NSICU arterial line was placed for treatment of hypertensive emergency. Repeat CTH showed stability. Pt was titrated off of cardene while maintaining a stable neurological exam. Pt transitioned to PO labetolol for HTN.       Vital Signs Last 24 Hrs  T(C): 36.2 (24 Dec 2023 16:00), Max: 37.1 (23 Dec 2023 20:00)  T(F): 97.1 (24 Dec 2023 16:00), Max: 98.7 (23 Dec 2023 20:00)  HR: 88 (24 Dec 2023 17:00) (79 - 104)  BP: 140/68 (24 Dec 2023 17:00) (98/51 - 197/89)  BP(mean): 92 (24 Dec 2023 17:00) (68 - 128)  RR: 25 (24 Dec 2023 17:00) (15 - 37)  SpO2: 98% (24 Dec 2023 17:00) (95% - 99%)    Parameters below as of 24 Dec 2023 16:00  Patient On (Oxygen Delivery Method): room air        I&O's Summary    23 Dec 2023 07:01  -  24 Dec 2023 07:00  --------------------------------------------------------  IN: 200 mL / OUT: 2000 mL / NET: -1800 mL    24 Dec 2023 07:01  -  24 Dec 2023 17:45  --------------------------------------------------------  IN: 720 mL / OUT: 600 mL / NET: 120 mL        Physical Exam:   General: No acute distress  HEENT: Anicteric sclerae  Cardiac: [id number]  Lungs: Clear  Abdomen: Soft, non-tender, +BS  Extremities: No c/c/e  Skin/Incision Site: Clean, dry and intact  Neurologic: Awake, alert, oriented x3, fluent speech, follows on all four, face symmetric, L eye chronic blindness, eomi, L face and arm hemiesthesia, motor strength 5/5 no drift      LABS:                               13.0   9.25  )-----------( 279      ( 24 Dec 2023 04:40 )             40.8       12-24    141  |  106  |  14  ----------------------------<  94  3.9   |  24  |  0.6<L>    Ca    8.7      24 Dec 2023 04:40  Phos  3.6     12-24  Mg     2.3     12-24    TPro  6.6  /  Alb  3.8  /  TBili  0.5  /  DBili  x   /  AST  16  /  ALT  11  /  AlkPhos  87  12-24              ASSESSMENT & PLAN:     #IPH  #Hypertensive Emergency  #    · Assessment	  68F w/ PMHx of GERD, glaucoma admitted with R thalamic IPH (ICH 0), hypertensive emergency to sbp 220-240    NEURO:  #IPH  q4h neurochecks  repeat CTH stable  CTA negative  stroke labs as documented  Activity: [x]edge of bed [] Bedrest [x] PT [x] OT [] PMNR      CV:  #Hypertensive Emergency  pt 100-140   Labetolol 100mg PO BID  Amlodipine added 12/25  TTE< from: TTE Echo Complete w/o Contrast w/ Doppler (12.24.23 @ 13:38) >     1. Hyperdynamic global left ventricular systolic function with a biplane   EF of 68%. Mild (grade I) diastolic dysfunction.   2. Normal right ventricular size and function.   3. Normal left atrial size.   4. Mild mitral valve regurgitation.   5. Adequate TR velocity was not obtained to accurately assess RVSP.   6. There is no evidence of pericardial effusion.    < end of copied text >          HEME/ONC:  VTE prophylaxis: [x] SCDs [x] chemoprophylaxis lovenox tonight          FOR FOLLOW UP:  [ ] MRI  [ ] PT/OT  [ ] SBP control - transition off PO labetalol to mono therapy with Amlodipine NSICU Transfer Note    NSICU ---> Medical floor (Rhinovirus (+))    Accepting Physician: Randolph    Signout given to: Alta Celeste NP- signed out 12/23 0530    HPI / CCU COURSE: 68F w/ PMHx of GERD, glaucoma presents for L sided numbness and LUE weakness that started on day of admission. Pt initially went to urgent care for evaluation, however SBP was measured at 190, 220 on repeat; pt was instructed to go to ED. SBP in 220s in ED. CTH showed 1.3cm R thalamic IPH. Pt denies any AC/AP. Pt given Labetalol IVP and started on Nicardipine gtt. While in NSICU arterial line was placed for treatment of hypertensive emergency. Repeat CTH showed stability. Pt was titrated off of cardene while maintaining a stable neurological exam. Pt transitioned to PO labetolol for HTN.       Vital Signs Last 24 Hrs  T(C): 36.2 (24 Dec 2023 16:00), Max: 37.1 (23 Dec 2023 20:00)  T(F): 97.1 (24 Dec 2023 16:00), Max: 98.7 (23 Dec 2023 20:00)  HR: 88 (24 Dec 2023 17:00) (79 - 104)  BP: 140/68 (24 Dec 2023 17:00) (98/51 - 197/89)  BP(mean): 92 (24 Dec 2023 17:00) (68 - 128)  RR: 25 (24 Dec 2023 17:00) (15 - 37)  SpO2: 98% (24 Dec 2023 17:00) (95% - 99%)    Parameters below as of 24 Dec 2023 16:00  Patient On (Oxygen Delivery Method): room air        I&O's Summary    23 Dec 2023 07:01  -  24 Dec 2023 07:00  --------------------------------------------------------  IN: 200 mL / OUT: 2000 mL / NET: -1800 mL    24 Dec 2023 07:01  -  24 Dec 2023 17:45  --------------------------------------------------------  IN: 720 mL / OUT: 600 mL / NET: 120 mL        Physical Exam:   General: No acute distress  HEENT: Anicteric sclerae  Cardiac: [id number]  Lungs: Clear  Abdomen: Soft, non-tender, +BS  Extremities: No c/c/e  Skin/Incision Site: Clean, dry and intact  Neurologic: Awake, alert, oriented x3, fluent speech, follows on all four, face symmetric, L eye chronic blindness, eomi, L face and arm hemiesthesia, motor strength 5/5 no drift      LABS:                               13.0   9.25  )-----------( 279      ( 24 Dec 2023 04:40 )             40.8       12-24    141  |  106  |  14  ----------------------------<  94  3.9   |  24  |  0.6<L>    Ca    8.7      24 Dec 2023 04:40  Phos  3.6     12-24  Mg     2.3     12-24    TPro  6.6  /  Alb  3.8  /  TBili  0.5  /  DBili  x   /  AST  16  /  ALT  11  /  AlkPhos  87  12-24              ASSESSMENT & PLAN:     #IPH  #Hypertensive Emergency  #    · Assessment	  68F w/ PMHx of GERD, glaucoma admitted with R thalamic IPH (ICH 0), hypertensive emergency to sbp 220-240    NEURO:  #IPH  q4h neurochecks  repeat CTH stable  CTA negative  stroke labs as documented  Activity: [x]edge of bed [] Bedrest [x] PT [x] OT [] PMNR      CV:  #Hypertensive Emergency  pt 100-140   Labetolol 100mg PO BID  Amlodipine added 12/25  TTE< from: TTE Echo Complete w/o Contrast w/ Doppler (12.24.23 @ 13:38) >     1. Hyperdynamic global left ventricular systolic function with a biplane   EF of 68%. Mild (grade I) diastolic dysfunction.   2. Normal right ventricular size and function.   3. Normal left atrial size.   4. Mild mitral valve regurgitation.   5. Adequate TR velocity was not obtained to accurately assess RVSP.   6. There is no evidence of pericardial effusion.    < end of copied text >          HEME/ONC:  VTE prophylaxis: [x] SCDs [x] chemoprophylaxis lovenox tonight          FOR FOLLOW UP:  [ ] MRI  [ ] PT/OT  [ ] SBP control - transition off PO labetalol to mono therapy with Amlodipine NSICU Transfer Note    NSICU ---> Medical floor (Rhinovirus (+))    Accepting Physician: Randolph    Signout given to: Alta Celeste NP- signed out 12/23 0530    HPI / CCU COURSE: 68F w/ PMHx of GERD, glaucoma presents for L sided numbness and LUE weakness that started on day of admission. Pt initially went to urgent care for evaluation, however SBP was measured at 190, 220 on repeat; pt was instructed to go to ED. SBP in 220s in ED. CTH showed 1.3cm R thalamic IPH. Pt denies any AC/AP. Pt given Labetalol IVP and started on Nicardipine gtt. While in NSICU arterial line was placed for treatment of hypertensive emergency. Repeat CTH showed stability. Pt was titrated off of cardene while maintaining a stable neurological exam. Pt transitioned to PO labetolol for HTN.       Vital Signs Last 24 Hrs  T(C): 36.2 (24 Dec 2023 16:00), Max: 37.1 (23 Dec 2023 20:00)  T(F): 97.1 (24 Dec 2023 16:00), Max: 98.7 (23 Dec 2023 20:00)  HR: 88 (24 Dec 2023 17:00) (79 - 104)  BP: 140/68 (24 Dec 2023 17:00) (98/51 - 197/89)  BP(mean): 92 (24 Dec 2023 17:00) (68 - 128)  RR: 25 (24 Dec 2023 17:00) (15 - 37)  SpO2: 98% (24 Dec 2023 17:00) (95% - 99%)    Parameters below as of 24 Dec 2023 16:00  Patient On (Oxygen Delivery Method): room air        I&O's Summary    23 Dec 2023 07:01  -  24 Dec 2023 07:00  --------------------------------------------------------  IN: 200 mL / OUT: 2000 mL / NET: -1800 mL    24 Dec 2023 07:01  -  24 Dec 2023 17:45  --------------------------------------------------------  IN: 720 mL / OUT: 600 mL / NET: 120 mL        Physical Exam:   General: No acute distress  HEENT: Anicteric sclerae  Cardiac: [id number]  Lungs: Clear  Abdomen: Soft, non-tender, +BS  Extremities: No c/c/e  Skin/Incision Site: Clean, dry and intact  Neurologic: Awake, alert, oriented x3, fluent speech, follows on all four, face symmetric, L eye chronic blindness, eomi, L face and arm hemiesthesia, motor strength 5/5 no drift      LABS:                               13.0   9.25  )-----------( 279      ( 24 Dec 2023 04:40 )             40.8       12-24    141  |  106  |  14  ----------------------------<  94  3.9   |  24  |  0.6<L>    Ca    8.7      24 Dec 2023 04:40  Phos  3.6     12-24  Mg     2.3     12-24    TPro  6.6  /  Alb  3.8  /  TBili  0.5  /  DBili  x   /  AST  16  /  ALT  11  /  AlkPhos  87  12-24              ASSESSMENT & PLAN:     #IPH  #Hypertensive Emergency  #    · Assessment	  68F w/ PMHx of GERD, glaucoma admitted with R thalamic IPH (ICH 0), hypertensive emergency to sbp 220-240    NEURO:  #IPH  q4h neurochecks  repeat CTH stable  CTA negative  stroke labs as documented  Activity: [x]edge of bed [] Bedrest [x] PT [x] OT [] PMNR      CV:  #Hypertensive Emergency  pt 100-140   Labetolol 100mg PO BID  Amlodipine added 12/25  TTE< from: TTE Echo Complete w/o Contrast w/ Doppler (12.24.23 @ 13:38) >     1. Hyperdynamic global left ventricular systolic function with a biplane   EF of 68%. Mild (grade I) diastolic dysfunction.   2. Normal right ventricular size and function.   3. Normal left atrial size.   4. Mild mitral valve regurgitation.   5. Adequate TR velocity was not obtained to accurately assess RVSP.   6. There is no evidence of pericardial effusion.    < end of copied text >          HEME/ONC:  VTE prophylaxis: [x] SCDs [x] chemoprophylaxis lovenox tonight          FOR FOLLOW UP:  [ ] MRI  [ ] PT/OT  [ ] SBP control - transition off PO labetalol to mono therapy with Amlodipine

## 2023-12-24 NOTE — PROGRESS NOTE ADULT - SUBJECTIVE AND OBJECTIVE BOX
SUMMARY:    68F w/ PMHx of GERD, glaucoma presents for L sided numbness and LUE weakness that started this morning at 10am. Pt initially went to urgent care for evaluation, however SBP was measured at 190, 220 on repeat; pt was instructed to go to ED. SBP in 220s in ED. CTH showed 1.3cm R thalamic IPH. Pt denies any AC/AP. Pt given Labetalol IVP and started on Nicardipine gtt.      OVERNIGHT EVENTS: Head CT scan- stable     ADMISSION SCORES:   GCS: 15 ICH Score: 0    SEDATION SCORES:  RASS: CAM-ICU:     REVIEW OF SYSTEMS:    VITALS: [] Reviewed    IMAGING/DATA: [] Reviewed    IVF FLUIDS/MEDICATIONS: [] Reviewed    ALLERGIES: Allergies    No Known Allergies    Intolerances      DEVICES:   [] Restraints [] PIVs [] ET tube [] central line [] PICC [] arterial line [] holder [] NGT/OGT [] EVD [] LD [] ALANA/HMV [] LiCOX [] ICP monitor [] Trach [] PEG [] Chest Tube [] other:    EXAMINATION:  General: No acute distress  HEENT: Anicteric sclerae  Cardiac: [id number]  Lungs: Clear  Abdomen: Soft, non-tender, +BS  Extremities: No c/c/e  Skin/Incision Site: Clean, dry and intact  Neurologic: Awake, alert, oriented x3, fluent speech, follows on all four, face symmetric, L eye chronic blindness, eomi, L face and arm hemiesthesia, motor strength 5/5 no drift      ICU Vital Signs Last 24 Hrs  T(C): 37.1 (23 Dec 2023 20:00), Max: 37.2 (23 Dec 2023 12:00)  T(F): 98.7 (23 Dec 2023 20:00), Max: 99 (23 Dec 2023 12:00)  HR: 81 (23 Dec 2023 22:30) (80 - 109)  BP: 165/78 (23 Dec 2023 22:00) (98/51 - 198/82)  BP(mean): 104 (23 Dec 2023 22:00) (70 - 118)  ABP: 152/60 (23 Dec 2023 22:30) (107/38 - 210/87)  ABP(mean): 94 (23 Dec 2023 22:30) (60 - 137)  RR: 34 (23 Dec 2023 22:30) (14 - 47)  SpO2: 98% (23 Dec 2023 22:30) (95% - 100%)      12-22-23 @ 07:01  -  12-23-23 @ 07:00  --------------------------------------------------------  IN: 620 mL / OUT: 0 mL / NET: 620 mL    12-23-23 @ 07:01  -  12-24-23 @ 00:20  --------------------------------------------------------  IN: 0 mL / OUT: 1650 mL / NET: -1650 mL        acetaminophen     Tablet .. 650 milliGRAM(s) Oral every 6 hours PRN  brimonidine 0.2% Ophthalmic Solution 1 Drop(s) Both EYES two times a day  chlorhexidine 2% Cloths 1 Application(s) Topical <User Schedule>  dorzolamide 2%/timolol 0.5% Ophthalmic Solution 1 Drop(s) Both EYES two times a day  dorzolamide 2%/timolol 0.5% Ophthalmic Solution 1 Drop(s) Left EYE every 12 hours  hydrALAZINE Injectable 5 milliGRAM(s) IV Push every 2 hours PRN  labetalol Injectable 10 milliGRAM(s) IV Push every 2 hours PRN  latanoprost 0.005% Ophthalmic Solution 1 Drop(s) Both EYES at bedtime  niCARdipine Infusion 5 mG/Hr (25 mL/Hr) IV Continuous <Continuous>  ondansetron Injectable 4 milliGRAM(s) IV Push once  pantoprazole    Tablet 40 milliGRAM(s) Oral before breakfast  polyethylene glycol 3350 17 Gram(s) Oral two times a day  senna 2 Tablet(s) Oral at bedtime      LABS:  Na: 138 (12-23 @ 06:18), 134 (12-22 @ 16:18)  K: 3.8 (12-23 @ 06:18), 4.7 (12-22 @ 16:18)  Cl: 106 (12-23 @ 06:18), 99 (12-22 @ 16:18)  CO2: 22 (12-23 @ 06:18), 25 (12-22 @ 16:18)  BUN: 9 (12-23 @ 06:18), 14 (12-22 @ 16:18)  Cr: <0.5 (12-23 @ 06:18), 0.6 (12-22 @ 16:18)  Glu: 118(12-23 @ 06:18), 101(12-22 @ 16:18)    Hgb: 13.0 (12-23 @ 06:18), 14.4 (12-22 @ 16:18)  Hct: 39.4 (12-23 @ 06:18), 44.5 (12-22 @ 16:18)  WBC: 11.48 (12-23 @ 06:18), 12.01 (12-22 @ 16:18)  Plt: 287 (12-23 @ 06:18), 299 (12-22 @ 16:18)    INR: 1.00 12-22-23 @ 16:18  PTT: 32.4 12-22-23 @ 16:18      LIVER FUNCTIONS - ( 23 Dec 2023 06:18 )  Alb: 4.0 g/dL / Pro: 6.7 g/dL / ALK PHOS: 95 U/L / ALT: 12 U/L / AST: 13 U/L / GGT: x                  SUMMARY:    68F w/ PMHx of GERD, glaucoma presents for L sided numbness and LUE weakness that started this morning at 10am. Pt initially went to urgent care for evaluation, however SBP was measured at 190, 220 on repeat; pt was instructed to go to ED. SBP in 220s in ED. CTH showed 1.3cm R thalamic IPH. Pt denies any AC/AP. Pt given Labetalol IVP and started on Nicardipine gtt.      OVERNIGHT EVENTS: Head CT scan- stable     ADMISSION SCORES:   GCS: 15 ICH Score: 0    SEDATION SCORES:  RASS: CAM-ICU:     REVIEW OF SYSTEMS:    VITALS: [] Reviewed    IMAGING/DATA: [] Reviewed    IVF FLUIDS/MEDICATIONS: [] Reviewed    ALLERGIES: Allergies    No Known Allergies    Intolerances      DEVICES:   [] Restraints [] PIVs [] ET tube [] central line [] PICC [] arterial line [] holder [] NGT/OGT [] EVD [] LD [] ALANA/HMV [] LiCOX [] ICP monitor [] Trach [] PEG [] Chest Tube [] other:    EXAMINATION:  General: No acute distress  HEENT: Anicteric sclerae  Cardiac: [id number]  Lungs: Clear  Abdomen: Soft, non-tender, +BS  Extremities: No c/c/e  Skin/Incision Site: Clean, dry and intact  Neurologic: Awake, alert, oriented x3, fluent speech, follows on all four, face symmetric, L eye chronic blindness, eomi, L face and arm hemiesthesia, motor strength 5/5 no drift      ICU Vital Signs Last 24 Hrs  T(C): 37.1 (23 Dec 2023 20:00), Max: 37.2 (23 Dec 2023 12:00)  T(F): 98.7 (23 Dec 2023 20:00), Max: 99 (23 Dec 2023 12:00)  HR: 81 (23 Dec 2023 22:30) (80 - 109)  BP: 165/78 (23 Dec 2023 22:00) (98/51 - 198/82)  BP(mean): 104 (23 Dec 2023 22:00) (70 - 118)  ABP: 152/60 (23 Dec 2023 22:30) (107/38 - 210/87)  ABP(mean): 94 (23 Dec 2023 22:30) (60 - 137)  RR: 34 (23 Dec 2023 22:30) (14 - 47)  SpO2: 98% (23 Dec 2023 22:30) (95% - 100%)      12-22-23 @ 07:01  -  12-23-23 @ 07:00  --------------------------------------------------------  IN: 620 mL / OUT: 0 mL / NET: 620 mL    12-23-23 @ 07:01  -  12-24-23 @ 00:20  --------------------------------------------------------  IN: 0 mL / OUT: 1650 mL / NET: -1650 mL        acetaminophen     Tablet .. 650 milliGRAM(s) Oral every 6 hours PRN  brimonidine 0.2% Ophthalmic Solution 1 Drop(s) Both EYES two times a day  chlorhexidine 2% Cloths 1 Application(s) Topical <User Schedule>  dorzolamide 2%/timolol 0.5% Ophthalmic Solution 1 Drop(s) Both EYES two times a day  dorzolamide 2%/timolol 0.5% Ophthalmic Solution 1 Drop(s) Left EYE every 12 hours  hydrALAZINE Injectable 5 milliGRAM(s) IV Push every 2 hours PRN  labetalol Injectable 10 milliGRAM(s) IV Push every 2 hours PRN  latanoprost 0.005% Ophthalmic Solution 1 Drop(s) Both EYES at bedtime  niCARdipine Infusion 5 mG/Hr (25 mL/Hr) IV Continuous <Continuous>  ondansetron Injectable 4 milliGRAM(s) IV Push once  pantoprazole    Tablet 40 milliGRAM(s) Oral before breakfast  polyethylene glycol 3350 17 Gram(s) Oral two times a day  senna 2 Tablet(s) Oral at bedtime      LABS:  Na: 138 (12-23 @ 06:18), 134 (12-22 @ 16:18)  K: 3.8 (12-23 @ 06:18), 4.7 (12-22 @ 16:18)  Cl: 106 (12-23 @ 06:18), 99 (12-22 @ 16:18)  CO2: 22 (12-23 @ 06:18), 25 (12-22 @ 16:18)  BUN: 9 (12-23 @ 06:18), 14 (12-22 @ 16:18)  Cr: <0.5 (12-23 @ 06:18), 0.6 (12-22 @ 16:18)  Glu: 118(12-23 @ 06:18), 101(12-22 @ 16:18)    Hgb: 13.0 (12-23 @ 06:18), 14.4 (12-22 @ 16:18)  Hct: 39.4 (12-23 @ 06:18), 44.5 (12-22 @ 16:18)  WBC: 11.48 (12-23 @ 06:18), 12.01 (12-22 @ 16:18)  Plt: 287 (12-23 @ 06:18), 299 (12-22 @ 16:18)    INR: 1.00 12-22-23 @ 16:18  PTT: 32.4 12-22-23 @ 16:18      LIVER FUNCTIONS - ( 23 Dec 2023 06:18 )  Alb: 4.0 g/dL / Pro: 6.7 g/dL / ALK PHOS: 95 U/L / ALT: 12 U/L / AST: 13 U/L / GGT: x                  SUMMARY:    68F w/ PMHx of GERD, glaucoma presents for L sided numbness and LUE weakness that started this morning at 10am. Pt initially went to urgent care for evaluation, however SBP was measured at 190, 220 on repeat; pt was instructed to go to ED. SBP in 220s in ED. CTH showed 1.3cm R thalamic IPH. Pt denies any AC/AP. Pt given Labetalol IVP and started on Nicardipine gtt.      OVERNIGHT EVENTS: Head CT scan- stable     ADMISSION SCORES:   GCS: 15 ICH Score: 0    SEDATION SCORES:  RASS: CAM-ICU:     REVIEW OF SYSTEMS:    VITALS: [] Reviewed    IMAGING/DATA: [] Reviewed    IVF FLUIDS/MEDICATIONS: [] Reviewed    ALLERGIES: Allergies    No Known Allergies    Intolerances      DEVICES:   [] Restraints [] PIVs [] ET tube [] central line [] PICC [] arterial line [] holder [] NGT/OGT [] EVD [] LD [] ALANA/HMV [] LiCOX [] ICP monitor [] Trach [] PEG [] Chest Tube [] other:    EXAMINATION:  General: No acute distress  HEENT: Anicteric sclerae  Cardiac: [id number]  Lungs: Clear  Abdomen: Soft, non-tender, +BS  Extremities: No c/c/e  Skin/Incision Site: Clean, dry and intact  Neurologic: Awake, alert, oriented x3, fluent speech, follows on all four, face symmetric, L eye chronic blindness, eomi, L face and arm hemiesthesia, motor strength 5/5 no drift            ICU Vital Signs Last 24 Hrs  T(C): 36.2 (24 Dec 2023 08:00), Max: 37.2 (23 Dec 2023 12:00)  T(F): 97.2 (24 Dec 2023 08:00), Max: 99 (23 Dec 2023 12:00)  HR: 97 (24 Dec 2023 09:00) (79 - 102)  BP: 132/62 (24 Dec 2023 09:00) (98/51 - 198/82)  BP(mean): 92 (24 Dec 2023 09:00) (70 - 119)  ABP: 173/72 (24 Dec 2023 03:00) (107/38 - 210/87)  ABP(mean): 114 (24 Dec 2023 03:00) (60 - 137)  RR: 40 (24 Dec 2023 09:00) (15 - 47)  SpO2: 98% (24 Dec 2023 09:00) (95% - 100%)      12-23-23 @ 07:01  -  12-24-23 @ 07:00  --------------------------------------------------------  IN: 200 mL / OUT: 2000 mL / NET: -1800 mL            acetaminophen     Tablet .. 650 milliGRAM(s) Oral every 6 hours PRN  brimonidine 0.2% Ophthalmic Solution 1 Drop(s) Both EYES two times a day  chlorhexidine 2% Cloths 1 Application(s) Topical <User Schedule>  dorzolamide 2%/timolol 0.5% Ophthalmic Solution 1 Drop(s) Left EYE every 12 hours  dorzolamide 2%/timolol 0.5% Ophthalmic Solution 1 Drop(s) Both EYES two times a day  enoxaparin Injectable 40 milliGRAM(s) SubCutaneous every 24 hours  hydrALAZINE Injectable 5 milliGRAM(s) IV Push every 2 hours PRN  labetalol Injectable 10 milliGRAM(s) IV Push every 2 hours PRN  latanoprost 0.005% Ophthalmic Solution 1 Drop(s) Both EYES at bedtime  magnesium hydroxide Suspension 30 milliLiter(s) Oral daily  niCARdipine Infusion 5 mG/Hr (25 mL/Hr) IV Continuous <Continuous>  ondansetron Injectable 4 milliGRAM(s) IV Push once  pantoprazole    Tablet 40 milliGRAM(s) Oral before breakfast  polyethylene glycol 3350 17 Gram(s) Oral two times a day  senna 2 Tablet(s) Oral at bedtime      LABS:  Na: 141 (12-24 @ 04:40), 138 (12-23 @ 06:18), 134 (12-22 @ 16:18)  K: 3.9 (12-24 @ 04:40), 3.8 (12-23 @ 06:18), 4.7 (12-22 @ 16:18)  Cl: 106 (12-24 @ 04:40), 106 (12-23 @ 06:18), 99 (12-22 @ 16:18)  CO2: 24 (12-24 @ 04:40), 22 (12-23 @ 06:18), 25 (12-22 @ 16:18)  BUN: 14 (12-24 @ 04:40), 9 (12-23 @ 06:18), 14 (12-22 @ 16:18)  Cr: 0.6 (12-24 @ 04:40), <0.5 (12-23 @ 06:18), 0.6 (12-22 @ 16:18)  Glu: 94(12-24 @ 04:40), 118(12-23 @ 06:18), 101(12-22 @ 16:18)    Hgb: 13.0 (12-24 @ 04:40), 13.0 (12-23 @ 06:18), 14.4 (12-22 @ 16:18)  Hct: 40.8 (12-24 @ 04:40), 39.4 (12-23 @ 06:18), 44.5 (12-22 @ 16:18)  WBC: 9.25 (12-24 @ 04:40), 11.48 (12-23 @ 06:18), 12.01 (12-22 @ 16:18)  Plt: 279 (12-24 @ 04:40), 287 (12-23 @ 06:18), 299 (12-22 @ 16:18)    INR: 1.00 12-22-23 @ 16:18  PTT: 32.4 12-22-23 @ 16:18          LIVER FUNCTIONS - ( 24 Dec 2023 04:40 )  Alb: 3.8 g/dL / Pro: 6.6 g/dL / ALK PHOS: 87 U/L / ALT: 11 U/L / AST: 16 U/L / GGT: x                             SUMMARY:    68F w/ PMHx of GERD, glaucoma presents for L sided numbness and LUE weakness that started this morning at 10am. Pt initially went to urgent care for evaluation, however SBP was measured at 190, 220 on repeat; pt was instructed to go to ED. SBP in 220s in ED. CTH showed 1.3cm R thalamic IPH. Pt denies any AC/AP. Pt given Labetalol IVP and started on Nicardipine gtt.      OVERNIGHT EVENTS: Head CT scan- stable     ADMISSION SCORES:   GCS: 15 ICH Score: 0    SEDATION SCORES:  RASS: CAM-ICU:     REVIEW OF SYSTEMS:    VITALS: [] Reviewed    IMAGING/DATA: [] Reviewed    IVF FLUIDS/MEDICATIONS: [] Reviewed    ALLERGIES: Allergies    No Known Allergies    Intolerances      DEVICES:   [] Restraints [] PIVs [] ET tube [] central line [] PICC [] arterial line [] holder [] NGT/OGT [] EVD [] LD [] ALANA/HMV [] LiCOX [] ICP monitor [] Trach [] PEG [] Chest Tube [] other:    EXAMINATION:  General: No acute distress  HEENT: Anicteric sclerae  Cardiac: [id number]  Lungs: Clear  Abdomen: Soft, non-tender, +BS  Extremities: No c/c/e  Skin/Incision Site: Clean, dry and intact  Neurologic: Awake, alert, oriented x3, fluent speech, follows on all four, face symmetric, L eye chronic blindness, eomi, L face and arm hemiesthesia, motor strength 5/5 no drift            ICU Vital Signs Last 24 Hrs  T(C): 36.2 (24 Dec 2023 08:00), Max: 37.2 (23 Dec 2023 12:00)  T(F): 97.2 (24 Dec 2023 08:00), Max: 99 (23 Dec 2023 12:00)  HR: 97 (24 Dec 2023 09:00) (79 - 102)  BP: 132/62 (24 Dec 2023 09:00) (98/51 - 198/82)  BP(mean): 92 (24 Dec 2023 09:00) (70 - 119)  ABP: 173/72 (24 Dec 2023 03:00) (107/38 - 210/87)  ABP(mean): 114 (24 Dec 2023 03:00) (60 - 137)  RR: 40 (24 Dec 2023 09:00) (15 - 47)  SpO2: 98% (24 Dec 2023 09:00) (95% - 100%)      12-23-23 @ 07:01  -  12-24-23 @ 07:00  --------------------------------------------------------  IN: 200 mL / OUT: 2000 mL / NET: -1800 mL            acetaminophen     Tablet .. 650 milliGRAM(s) Oral every 6 hours PRN  brimonidine 0.2% Ophthalmic Solution 1 Drop(s) Both EYES two times a day  chlorhexidine 2% Cloths 1 Application(s) Topical <User Schedule>  dorzolamide 2%/timolol 0.5% Ophthalmic Solution 1 Drop(s) Left EYE every 12 hours  dorzolamide 2%/timolol 0.5% Ophthalmic Solution 1 Drop(s) Both EYES two times a day  enoxaparin Injectable 40 milliGRAM(s) SubCutaneous every 24 hours  hydrALAZINE Injectable 5 milliGRAM(s) IV Push every 2 hours PRN  labetalol Injectable 10 milliGRAM(s) IV Push every 2 hours PRN  latanoprost 0.005% Ophthalmic Solution 1 Drop(s) Both EYES at bedtime  magnesium hydroxide Suspension 30 milliLiter(s) Oral daily  niCARdipine Infusion 5 mG/Hr (25 mL/Hr) IV Continuous <Continuous>  ondansetron Injectable 4 milliGRAM(s) IV Push once  pantoprazole    Tablet 40 milliGRAM(s) Oral before breakfast  polyethylene glycol 3350 17 Gram(s) Oral two times a day  senna 2 Tablet(s) Oral at bedtime      LABS:  Na: 141 (12-24 @ 04:40), 138 (12-23 @ 06:18), 134 (12-22 @ 16:18)  K: 3.9 (12-24 @ 04:40), 3.8 (12-23 @ 06:18), 4.7 (12-22 @ 16:18)  Cl: 106 (12-24 @ 04:40), 106 (12-23 @ 06:18), 99 (12-22 @ 16:18)  CO2: 24 (12-24 @ 04:40), 22 (12-23 @ 06:18), 25 (12-22 @ 16:18)  BUN: 14 (12-24 @ 04:40), 9 (12-23 @ 06:18), 14 (12-22 @ 16:18)  Cr: 0.6 (12-24 @ 04:40), <0.5 (12-23 @ 06:18), 0.6 (12-22 @ 16:18)  Glu: 94(12-24 @ 04:40), 118(12-23 @ 06:18), 101(12-22 @ 16:18)    Hgb: 13.0 (12-24 @ 04:40), 13.0 (12-23 @ 06:18), 14.4 (12-22 @ 16:18)  Hct: 40.8 (12-24 @ 04:40), 39.4 (12-23 @ 06:18), 44.5 (12-22 @ 16:18)  WBC: 9.25 (12-24 @ 04:40), 11.48 (12-23 @ 06:18), 12.01 (12-22 @ 16:18)  Plt: 279 (12-24 @ 04:40), 287 (12-23 @ 06:18), 299 (12-22 @ 16:18)    INR: 1.00 12-22-23 @ 16:18  PTT: 32.4 12-22-23 @ 16:18          LIVER FUNCTIONS - ( 24 Dec 2023 04:40 )  Alb: 3.8 g/dL / Pro: 6.6 g/dL / ALK PHOS: 87 U/L / ALT: 11 U/L / AST: 16 U/L / GGT: x

## 2023-12-24 NOTE — PROGRESS NOTE ADULT - CRITICAL CARE ATTENDING COMMENT
68F w/ PMHx of GERD, glaucoma admitted with R thalamic IPH (ICH 0), hypertensive emergency to sbp 220-240    plan of care as above
68F w/ PMHx of GERD, glaucoma admitted with R thalamic IPH (ICH 0), hypertensive emergency to sbp 220-240    plan of care as above

## 2023-12-24 NOTE — PROGRESS NOTE ADULT - ASSESSMENT
68F w/ PMHx of GERD, glaucoma admitted with R thalamic IPH (ICH 0), hypertensive emergency to sbp 220-240    NEURO:  q2h neurochecks  CTH today for stability  CTA negative  Activity: [x]edge of bed [] Bedrest [x] PT [x] OT [] PMNR    PULM:  Incentive spirometry, mobilize as tolerated  SpO2>94%  HOB>30  Aspiration precautions    CV:  pt 160-180 o/n; will now deescalate to 140-160 this afternoon   Nicardipine gtt prn  EKG  TTE    RENAL:  IVL  Monitor I&Os    GI:  Diet: dash diet  GI prophylaxis [] not indicated [x] PPI- home med [] pepcid  Bowel regimen [] miralax [x] senna [] other:    ENDO:   Goal -180  a1c  tsh    HEME/ONC:  VTE prophylaxis: [x] SCDs [] chemoprophylaxis [x] hold chemoprophylaxis due to: ICH    ID:  Maintain normothermia    MISC:    SOCIAL/FAMILY:  [] awaiting [x] updated at bedside [] family meeting    CODE STATUS:  [x] Full Code [] DNR [] DNI [] Palliative/Comfort Care    DISPOSITION:  [x] ICU [] Stroke Unit [] Floor [] CEU [] Tele 68F w/ PMHx of GERD, glaucoma admitted with R thalamic IPH (ICH 0), hypertensive emergency to sbp 220-240    NEURO:  q4h neurochecks  CTH stable  CTA negative  BP control per CV plan below   Activity: [x]oobtc with assist [] Bedrest [x] PT [x] OT [] PMNR    PULM:  Incentive spirometry, mobilize as tolerated  SpO2>94%  HOB>30  Aspiration precautions    CV:  pt 110-140 this afternoon  prn labetalol   EKG  TTE    RENAL:  IVL  Monitor I&Os  obtain UA    GI:  Diet: dash diet  GI prophylaxis [] not indicated [x] PPI- home med [] pepcid  Bowel regimen [] miralax [x] senna [] other:  LBM pending    ENDO:   goal euglycemia  a1c 5.9  tsh 2.29    HEME/ONC:  VTE prophylaxis: [x] SCDs/chemoppx with SQL    ID:  Maintain normothermia  pt with mild sore throat- found to be rhinovirus +-- supportive care and contact/droplet precautions     MISC:    SOCIAL/FAMILY:  [] awaiting [x] updated at bedside [] family meeting    CODE STATUS:  [x] Full Code [] DNR [] DNI [] Palliative/Comfort Care    DISPOSITION:  stable for stroke unit

## 2023-12-25 LAB
ALBUMIN SERPL ELPH-MCNC: 3.8 G/DL — SIGNIFICANT CHANGE UP (ref 3.5–5.2)
ALBUMIN SERPL ELPH-MCNC: 3.8 G/DL — SIGNIFICANT CHANGE UP (ref 3.5–5.2)
ALP SERPL-CCNC: 86 U/L — SIGNIFICANT CHANGE UP (ref 30–115)
ALP SERPL-CCNC: 86 U/L — SIGNIFICANT CHANGE UP (ref 30–115)
ALT FLD-CCNC: 11 U/L — SIGNIFICANT CHANGE UP (ref 0–41)
ALT FLD-CCNC: 11 U/L — SIGNIFICANT CHANGE UP (ref 0–41)
ANION GAP SERPL CALC-SCNC: 11 MMOL/L — SIGNIFICANT CHANGE UP (ref 7–14)
ANION GAP SERPL CALC-SCNC: 11 MMOL/L — SIGNIFICANT CHANGE UP (ref 7–14)
AST SERPL-CCNC: 12 U/L — SIGNIFICANT CHANGE UP (ref 0–41)
AST SERPL-CCNC: 12 U/L — SIGNIFICANT CHANGE UP (ref 0–41)
BASOPHILS # BLD AUTO: 0.03 K/UL — SIGNIFICANT CHANGE UP (ref 0–0.2)
BASOPHILS # BLD AUTO: 0.03 K/UL — SIGNIFICANT CHANGE UP (ref 0–0.2)
BASOPHILS NFR BLD AUTO: 0.3 % — SIGNIFICANT CHANGE UP (ref 0–1)
BASOPHILS NFR BLD AUTO: 0.3 % — SIGNIFICANT CHANGE UP (ref 0–1)
BILIRUB SERPL-MCNC: 0.4 MG/DL — SIGNIFICANT CHANGE UP (ref 0.2–1.2)
BILIRUB SERPL-MCNC: 0.4 MG/DL — SIGNIFICANT CHANGE UP (ref 0.2–1.2)
BUN SERPL-MCNC: 14 MG/DL — SIGNIFICANT CHANGE UP (ref 10–20)
BUN SERPL-MCNC: 14 MG/DL — SIGNIFICANT CHANGE UP (ref 10–20)
CALCIUM SERPL-MCNC: 8.5 MG/DL — SIGNIFICANT CHANGE UP (ref 8.4–10.5)
CALCIUM SERPL-MCNC: 8.5 MG/DL — SIGNIFICANT CHANGE UP (ref 8.4–10.5)
CHLORIDE SERPL-SCNC: 104 MMOL/L — SIGNIFICANT CHANGE UP (ref 98–110)
CHLORIDE SERPL-SCNC: 104 MMOL/L — SIGNIFICANT CHANGE UP (ref 98–110)
CO2 SERPL-SCNC: 22 MMOL/L — SIGNIFICANT CHANGE UP (ref 17–32)
CO2 SERPL-SCNC: 22 MMOL/L — SIGNIFICANT CHANGE UP (ref 17–32)
CREAT SERPL-MCNC: 0.6 MG/DL — LOW (ref 0.7–1.5)
CREAT SERPL-MCNC: 0.6 MG/DL — LOW (ref 0.7–1.5)
EGFR: 98 ML/MIN/1.73M2 — SIGNIFICANT CHANGE UP
EGFR: 98 ML/MIN/1.73M2 — SIGNIFICANT CHANGE UP
EOSINOPHIL # BLD AUTO: 0.21 K/UL — SIGNIFICANT CHANGE UP (ref 0–0.7)
EOSINOPHIL # BLD AUTO: 0.21 K/UL — SIGNIFICANT CHANGE UP (ref 0–0.7)
EOSINOPHIL NFR BLD AUTO: 2.2 % — SIGNIFICANT CHANGE UP (ref 0–8)
EOSINOPHIL NFR BLD AUTO: 2.2 % — SIGNIFICANT CHANGE UP (ref 0–8)
GLUCOSE SERPL-MCNC: 100 MG/DL — HIGH (ref 70–99)
GLUCOSE SERPL-MCNC: 100 MG/DL — HIGH (ref 70–99)
HCT VFR BLD CALC: 40.1 % — SIGNIFICANT CHANGE UP (ref 37–47)
HCT VFR BLD CALC: 40.1 % — SIGNIFICANT CHANGE UP (ref 37–47)
HGB BLD-MCNC: 12.9 G/DL — SIGNIFICANT CHANGE UP (ref 12–16)
HGB BLD-MCNC: 12.9 G/DL — SIGNIFICANT CHANGE UP (ref 12–16)
IMM GRANULOCYTES NFR BLD AUTO: 0.2 % — SIGNIFICANT CHANGE UP (ref 0.1–0.3)
IMM GRANULOCYTES NFR BLD AUTO: 0.2 % — SIGNIFICANT CHANGE UP (ref 0.1–0.3)
LYMPHOCYTES # BLD AUTO: 2.15 K/UL — SIGNIFICANT CHANGE UP (ref 1.2–3.4)
LYMPHOCYTES # BLD AUTO: 2.15 K/UL — SIGNIFICANT CHANGE UP (ref 1.2–3.4)
LYMPHOCYTES # BLD AUTO: 22.9 % — SIGNIFICANT CHANGE UP (ref 20.5–51.1)
LYMPHOCYTES # BLD AUTO: 22.9 % — SIGNIFICANT CHANGE UP (ref 20.5–51.1)
MAGNESIUM SERPL-MCNC: 2.4 MG/DL — SIGNIFICANT CHANGE UP (ref 1.8–2.4)
MAGNESIUM SERPL-MCNC: 2.4 MG/DL — SIGNIFICANT CHANGE UP (ref 1.8–2.4)
MCHC RBC-ENTMCNC: 27.6 PG — SIGNIFICANT CHANGE UP (ref 27–31)
MCHC RBC-ENTMCNC: 27.6 PG — SIGNIFICANT CHANGE UP (ref 27–31)
MCHC RBC-ENTMCNC: 32.2 G/DL — SIGNIFICANT CHANGE UP (ref 32–37)
MCHC RBC-ENTMCNC: 32.2 G/DL — SIGNIFICANT CHANGE UP (ref 32–37)
MCV RBC AUTO: 85.9 FL — SIGNIFICANT CHANGE UP (ref 81–99)
MCV RBC AUTO: 85.9 FL — SIGNIFICANT CHANGE UP (ref 81–99)
MONOCYTES # BLD AUTO: 0.92 K/UL — HIGH (ref 0.1–0.6)
MONOCYTES # BLD AUTO: 0.92 K/UL — HIGH (ref 0.1–0.6)
MONOCYTES NFR BLD AUTO: 9.8 % — HIGH (ref 1.7–9.3)
MONOCYTES NFR BLD AUTO: 9.8 % — HIGH (ref 1.7–9.3)
NEUTROPHILS # BLD AUTO: 6.05 K/UL — SIGNIFICANT CHANGE UP (ref 1.4–6.5)
NEUTROPHILS # BLD AUTO: 6.05 K/UL — SIGNIFICANT CHANGE UP (ref 1.4–6.5)
NEUTROPHILS NFR BLD AUTO: 64.6 % — SIGNIFICANT CHANGE UP (ref 42.2–75.2)
NEUTROPHILS NFR BLD AUTO: 64.6 % — SIGNIFICANT CHANGE UP (ref 42.2–75.2)
NRBC # BLD: 0 /100 WBCS — SIGNIFICANT CHANGE UP (ref 0–0)
NRBC # BLD: 0 /100 WBCS — SIGNIFICANT CHANGE UP (ref 0–0)
PHOSPHATE SERPL-MCNC: 3.8 MG/DL — SIGNIFICANT CHANGE UP (ref 2.1–4.9)
PHOSPHATE SERPL-MCNC: 3.8 MG/DL — SIGNIFICANT CHANGE UP (ref 2.1–4.9)
PLATELET # BLD AUTO: 265 K/UL — SIGNIFICANT CHANGE UP (ref 130–400)
PLATELET # BLD AUTO: 265 K/UL — SIGNIFICANT CHANGE UP (ref 130–400)
PMV BLD: 10.8 FL — HIGH (ref 7.4–10.4)
PMV BLD: 10.8 FL — HIGH (ref 7.4–10.4)
POTASSIUM SERPL-MCNC: 4.1 MMOL/L — SIGNIFICANT CHANGE UP (ref 3.5–5)
POTASSIUM SERPL-MCNC: 4.1 MMOL/L — SIGNIFICANT CHANGE UP (ref 3.5–5)
POTASSIUM SERPL-SCNC: 4.1 MMOL/L — SIGNIFICANT CHANGE UP (ref 3.5–5)
POTASSIUM SERPL-SCNC: 4.1 MMOL/L — SIGNIFICANT CHANGE UP (ref 3.5–5)
PROT SERPL-MCNC: 6.4 G/DL — SIGNIFICANT CHANGE UP (ref 6–8)
PROT SERPL-MCNC: 6.4 G/DL — SIGNIFICANT CHANGE UP (ref 6–8)
RBC # BLD: 4.67 M/UL — SIGNIFICANT CHANGE UP (ref 4.2–5.4)
RBC # BLD: 4.67 M/UL — SIGNIFICANT CHANGE UP (ref 4.2–5.4)
RBC # FLD: 14 % — SIGNIFICANT CHANGE UP (ref 11.5–14.5)
RBC # FLD: 14 % — SIGNIFICANT CHANGE UP (ref 11.5–14.5)
SODIUM SERPL-SCNC: 137 MMOL/L — SIGNIFICANT CHANGE UP (ref 135–146)
SODIUM SERPL-SCNC: 137 MMOL/L — SIGNIFICANT CHANGE UP (ref 135–146)
WBC # BLD: 9.38 K/UL — SIGNIFICANT CHANGE UP (ref 4.8–10.8)
WBC # BLD: 9.38 K/UL — SIGNIFICANT CHANGE UP (ref 4.8–10.8)
WBC # FLD AUTO: 9.38 K/UL — SIGNIFICANT CHANGE UP (ref 4.8–10.8)
WBC # FLD AUTO: 9.38 K/UL — SIGNIFICANT CHANGE UP (ref 4.8–10.8)

## 2023-12-25 PROCEDURE — 99232 SBSQ HOSP IP/OBS MODERATE 35: CPT

## 2023-12-25 RX ORDER — AMLODIPINE BESYLATE 2.5 MG/1
5 TABLET ORAL DAILY
Refills: 0 | Status: DISCONTINUED | OUTPATIENT
Start: 2023-12-25 | End: 2023-12-28

## 2023-12-25 RX ADMIN — PANTOPRAZOLE SODIUM 40 MILLIGRAM(S): 20 TABLET, DELAYED RELEASE ORAL at 06:23

## 2023-12-25 RX ADMIN — CHLORHEXIDINE GLUCONATE 1 APPLICATION(S): 213 SOLUTION TOPICAL at 05:38

## 2023-12-25 RX ADMIN — ENOXAPARIN SODIUM 40 MILLIGRAM(S): 100 INJECTION SUBCUTANEOUS at 17:37

## 2023-12-25 RX ADMIN — POLYETHYLENE GLYCOL 3350 17 GRAM(S): 17 POWDER, FOR SOLUTION ORAL at 17:37

## 2023-12-25 RX ADMIN — Medication 100 MILLIGRAM(S): at 05:38

## 2023-12-25 NOTE — PROGRESS NOTE ADULT - NS ATTEND AMEND GEN_ALL_CORE FT
61yo woman with h/o GERD and glaucoma, not on any antihypertensive meds as home, admitted 12/22 with L arm numbness in setting of hypertensive emergency, CTH with a small R thalamic ICH. Most recent CTH stable. BP well controlled on labetalol.   Hospital course otherwise notable for rhinovirus positive.     On exam, alert, speech is fluent, briskly follows commands, face symmetric, with L facial numbness, 5/5 in all 4 extremities, able to walk with walker.    Assessment: Etiology of ICH is HTN. ICH score 0. Stable for floor     -160 for hypertensive ICH  add amlodipine 5 mg daily to wean labetalol   LBM 12/25  Lov ppx     I personally updated the patient's  and sons at bedside.    Patient seen and examined by attending on 12/25/2023.    Patient is not critically ill but is medically complex due to ICH and titrating meds for HTN.

## 2023-12-25 NOTE — PROGRESS NOTE ADULT - ASSESSMENT
68F w/ PMHx of GERD, glaucoma admitted with R thalamic IPH (ICH 0), hypertensive emergency to sbp 220-240    NEURO:  q4h neurochecks  CTH stable  CTA negative  BP control per CV plan below   Activity: [x]oobtc with assist [] Bedrest [x] PT [x] OT [] PMNR    PULM:  Incentive spirometry, mobilize as tolerated  SpO2>94%  HOB>30  Aspiration precautions    CV:  pt 110-160 this afternoon  prn labetalol  Labetolol PO, Amlodipine added    TTE - noted    RENAL:  IVL  Monitor I&Os  UA- neg    GI:  Diet: dash diet  GI prophylaxis [] not indicated [x] PPI- home med [] pepcid  Bowel regimen [] miralax [x] senna [] other:  LBM 12/25    ENDO:   goal euglycemia  a1c 5.9  tsh 2.29    HEME/ONC:  VTE prophylaxis: [x] SCDs/chemoppx with SQL    ID:  Maintain normothermia  pt with mild sore throat- found to be rhinovirus +-- supportive care and contact/droplet precautions     MISC:    SOCIAL/FAMILY:  [] awaiting [x] updated at bedside [] family meeting    CODE STATUS:  [x] Full Code [] DNR [] DNI [] Palliative/Comfort Care    DISPOSITION:  Any medical floor - neurology following

## 2023-12-25 NOTE — PROGRESS NOTE ADULT - SUBJECTIVE AND OBJECTIVE BOX
SUMMARY:    68F w/ PMHx of GERD, glaucoma presents for L sided numbness and LUE weakness that started this morning at 10am. Pt initially went to urgent care for evaluation, however SBP was measured at 190, 220 on repeat; pt was instructed to go to ED. SBP in 220s in ED. CTH showed 1.3cm R thalamic IPH. Pt denies any AC/AP. Pt given Labetalol IVP and started on Nicardipine gtt.      OVERNIGHT EVENTS: NAEO    ADMISSION SCORES:   GCS: 15 ICH Score: 0    SEDATION SCORES:  RASS: CAM-ICU:     REVIEW OF SYSTEMS:    VITALS: [] Reviewed    IMAGING/DATA: [] Reviewed    IVF FLUIDS/MEDICATIONS: [] Reviewed    ALLERGIES: Allergies    No Known Allergies    Intolerances      DEVICES:   [] Restraints [] PIVs [] ET tube [] central line [] PICC [] arterial line [] holder [] NGT/OGT [] EVD [] LD [] ALANA/HMV [] LiCOX [] ICP monitor [] Trach [] PEG [] Chest Tube [] other:    EXAMINATION:  General: No acute distress  HEENT: Anicteric sclerae  Cardiac: [id number]  Lungs: Clear  Abdomen: Soft, non-tender, +BS  Extremities: No c/c/e  Skin/Incision Site: Clean, dry and intact  Neurologic: Awake, alert, oriented x3, fluent speech, follows on all four, face symmetric, L eye chronic blindness, eomi, L face and arm hemiesthesia, motor strength 5/5 no drift    ICU Vital Signs Last 24 Hrs  T(C): 36.6 (25 Dec 2023 08:00), Max: 37 (25 Dec 2023 00:00)  T(F): 97.9 (25 Dec 2023 08:00), Max: 98.6 (25 Dec 2023 00:00)  HR: 90 (25 Dec 2023 11:00) (76 - 104)  BP: 118/57 (25 Dec 2023 11:00) (96/49 - 197/89)  BP(mean): 82 (25 Dec 2023 11:00) (68 - 128)  ABP: --  ABP(mean): --  RR: 37 (25 Dec 2023 11:00) (14 - 41)  SpO2: 99% (25 Dec 2023 11:00) (95% - 100%)      12-24-23 @ 07:01  -  12-25-23 @ 07:00  --------------------------------------------------------  IN: 720 mL / OUT: 1000 mL / NET: -280 mL          LABS:  Na: 137 (12-25 @ 04:16), 141 (12-24 @ 04:40), 138 (12-23 @ 06:18), 134 (12-22 @ 16:18)  K: 4.1 (12-25 @ 04:16), 3.9 (12-24 @ 04:40), 3.8 (12-23 @ 06:18), 4.7 (12-22 @ 16:18)  Cl: 104 (12-25 @ 04:16), 106 (12-24 @ 04:40), 106 (12-23 @ 06:18), 99 (12-22 @ 16:18)  CO2: 22 (12-25 @ 04:16), 24 (12-24 @ 04:40), 22 (12-23 @ 06:18), 25 (12-22 @ 16:18)  BUN: 14 (12-25 @ 04:16), 14 (12-24 @ 04:40), 9 (12-23 @ 06:18), 14 (12-22 @ 16:18)  Cr: 0.6 (12-25 @ 04:16), 0.6 (12-24 @ 04:40), <0.5 (12-23 @ 06:18), 0.6 (12-22 @ 16:18)  Glu: 100(12-25 @ 04:16), 94(12-24 @ 04:40), 118(12-23 @ 06:18), 101(12-22 @ 16:18)    Hgb: 12.9 (12-25 @ 04:16), 13.0 (12-24 @ 04:40), 13.0 (12-23 @ 06:18), 14.4 (12-22 @ 16:18)  Hct: 40.1 (12-25 @ 04:16), 40.8 (12-24 @ 04:40), 39.4 (12-23 @ 06:18), 44.5 (12-22 @ 16:18)  WBC: 9.38 (12-25 @ 04:16), 9.25 (12-24 @ 04:40), 11.48 (12-23 @ 06:18), 12.01 (12-22 @ 16:18)  Plt: 265 (12-25 @ 04:16), 279 (12-24 @ 04:40), 287 (12-23 @ 06:18), 299 (12-22 @ 16:18)    INR: 1.00 12-22-23 @ 16:18  PTT: 32.4 12-22-23 @ 16:18          LIVER FUNCTIONS - ( 25 Dec 2023 04:16 )  Alb: 3.8 g/dL / Pro: 6.4 g/dL / ALK PHOS: 86 U/L / ALT: 11 U/L / AST: 12 U/L / GGT: x                 MEDICATIONS  (STANDING):  chlorhexidine 2% Cloths 1 Application(s) Topical <User Schedule>  dorzolamide 2%/timolol 0.5% Ophthalmic Solution 1 Drop(s) Left EYE every 12 hours  enoxaparin Injectable 40 milliGRAM(s) SubCutaneous every 24 hours  labetalol 100 milliGRAM(s) Oral every 12 hours  magnesium hydroxide Suspension 30 milliLiter(s) Oral daily  ondansetron Injectable 4 milliGRAM(s) IV Push once  pantoprazole    Tablet 40 milliGRAM(s) Oral before breakfast  polyethylene glycol 3350 17 Gram(s) Oral two times a day  senna 2 Tablet(s) Oral at bedtime    MEDICATIONS  (PRN):  acetaminophen     Tablet .. 650 milliGRAM(s) Oral every 6 hours PRN Temp greater or equal to 38C (100.4F), Mild Pain (1 - 3)  benzocaine 20% Spray 1 Spray(s) Topical every 6 hours PRN sore throat  hydrALAZINE Injectable 5 milliGRAM(s) IV Push every 2 hours PRN sbp >180  labetalol Injectable 10 milliGRAM(s) IV Push every 2 hours PRN sbp >180         SUMMARY:    68F w/ PMHx of GERD, glaucoma presents for L sided numbness and LUE weakness that started this morning at 10am. Pt initially went to urgent care for evaluation, however SBP was measured at 190, 220 on repeat; pt was instructed to go to ED. SBP in 220s in ED. CTH showed 1.3cm R thalamic IPH. Pt denies any AC/AP. Pt given Labetalol IVP and started on Nicardipine gtt.      OVERNIGHT EVENTS: NAEO    ADMISSION SCORES:   GCS: 15 ICH Score: 0    SEDATION SCORES:  RASS: CAM-ICU:     REVIEW OF SYSTEMS:    VITALS: [] Reviewed    IMAGING/DATA: [] Reviewed    IVF FLUIDS/MEDICATIONS: [] Reviewed    ALLERGIES: Allergies    No Known Allergies    Intolerances      DEVICES:   [] Restraints [] PIVs [] ET tube [] central line [] PICC [] arterial line [] holder [] NGT/OGT [] EVD [] LD [] ALANA/HMV [] LiCOX [] ICP monitor [] Trach [] PEG [] Chest Tube [] other:    EXAMINATION:  General: No acute distress  HEENT: Anicteric sclerae  Cardiac: [id number]  Lungs: Clear  Abdomen: Soft, non-tender, +BS  Extremities: No c/c/e  Skin/Incision Site: Clean, dry and intact  Neurologic: Awake, alert, oriented x3, fluent speech, follows on all four, face symmetric, L eye chronic blindness, eomi, L face and arm hemiesthesia, motor strength 5/5 no drift    ICU Vital Signs Last 24 Hrs  T(C): 36.6 (25 Dec 2023 08:00), Max: 37 (25 Dec 2023 00:00)  T(F): 97.9 (25 Dec 2023 08:00), Max: 98.6 (25 Dec 2023 00:00)  HR: 90 (25 Dec 2023 11:00) (76 - 104)  BP: 118/57 (25 Dec 2023 11:00) (96/49 - 197/89)  BP(mean): 82 (25 Dec 2023 11:00) (68 - 128)  ABP: --  ABP(mean): --  RR: 37 (25 Dec 2023 11:00) (14 - 41)  SpO2: 99% (25 Dec 2023 11:00) (95% - 100%)      12-24-23 @ 07:01  -  12-25-23 @ 07:00  --------------------------------------------------------  IN: 720 mL / OUT: 1000 mL / NET: -280 mL          LABS:  Na: 137 (12-25 @ 04:16), 141 (12-24 @ 04:40), 138 (12-23 @ 06:18), 134 (12-22 @ 16:18)  K: 4.1 (12-25 @ 04:16), 3.9 (12-24 @ 04:40), 3.8 (12-23 @ 06:18), 4.7 (12-22 @ 16:18)  Cl: 104 (12-25 @ 04:16), 106 (12-24 @ 04:40), 106 (12-23 @ 06:18), 99 (12-22 @ 16:18)  CO2: 22 (12-25 @ 04:16), 24 (12-24 @ 04:40), 22 (12-23 @ 06:18), 25 (12-22 @ 16:18)  BUN: 14 (12-25 @ 04:16), 14 (12-24 @ 04:40), 9 (12-23 @ 06:18), 14 (12-22 @ 16:18)  Cr: 0.6 (12-25 @ 04:16), 0.6 (12-24 @ 04:40), <0.5 (12-23 @ 06:18), 0.6 (12-22 @ 16:18)  Glu: 100(12-25 @ 04:16), 94(12-24 @ 04:40), 118(12-23 @ 06:18), 101(12-22 @ 16:18)    Hgb: 12.9 (12-25 @ 04:16), 13.0 (12-24 @ 04:40), 13.0 (12-23 @ 06:18), 14.4 (12-22 @ 16:18)  Hct: 40.1 (12-25 @ 04:16), 40.8 (12-24 @ 04:40), 39.4 (12-23 @ 06:18), 44.5 (12-22 @ 16:18)  WBC: 9.38 (12-25 @ 04:16), 9.25 (12-24 @ 04:40), 11.48 (12-23 @ 06:18), 12.01 (12-22 @ 16:18)  Plt: 265 (12-25 @ 04:16), 279 (12-24 @ 04:40), 287 (12-23 @ 06:18), 299 (12-22 @ 16:18)    INR: 1.00 12-22-23 @ 16:18  PTT: 32.4 12-22-23 @ 16:18          LIVER FUNCTIONS - ( 25 Dec 2023 04:16 )  Alb: 3.8 g/dL / Pro: 6.4 g/dL / ALK PHOS: 86 U/L / ALT: 11 U/L / AST: 12 U/L / GGT: x                 MEDICATIONS  (STANDING):  chlorhexidine 2% Cloths 1 Application(s) Topical <User Schedule>  dorzolamide 2%/timolol 0.5% Ophthalmic Solution 1 Drop(s) Left EYE every 12 hours  enoxaparin Injectable 40 milliGRAM(s) SubCutaneous every 24 hours  labetalol 100 milliGRAM(s) Oral every 12 hours  magnesium hydroxide Suspension 30 milliLiter(s) Oral daily  ondansetron Injectable 4 milliGRAM(s) IV Push once  pantoprazole    Tablet 40 milliGRAM(s) Oral before breakfast  polyethylene glycol 3350 17 Gram(s) Oral two times a day  senna 2 Tablet(s) Oral at bedtime    MEDICATIONS  (PRN):  acetaminophen     Tablet .. 650 milliGRAM(s) Oral every 6 hours PRN Temp greater or equal to 38C (100.4F), Mild Pain (1 - 3)  benzocaine 20% Spray 1 Spray(s) Topical every 6 hours PRN sore throat  hydrALAZINE Injectable 5 milliGRAM(s) IV Push every 2 hours PRN sbp >180  labetalol Injectable 10 milliGRAM(s) IV Push every 2 hours PRN sbp >180

## 2023-12-26 ENCOUNTER — TRANSCRIPTION ENCOUNTER (OUTPATIENT)
Age: 68
End: 2023-12-26

## 2023-12-26 LAB
ANION GAP SERPL CALC-SCNC: 11 MMOL/L — SIGNIFICANT CHANGE UP (ref 7–14)
ANION GAP SERPL CALC-SCNC: 11 MMOL/L — SIGNIFICANT CHANGE UP (ref 7–14)
BASOPHILS # BLD AUTO: 0.04 K/UL — SIGNIFICANT CHANGE UP (ref 0–0.2)
BASOPHILS # BLD AUTO: 0.04 K/UL — SIGNIFICANT CHANGE UP (ref 0–0.2)
BASOPHILS NFR BLD AUTO: 0.5 % — SIGNIFICANT CHANGE UP (ref 0–1)
BASOPHILS NFR BLD AUTO: 0.5 % — SIGNIFICANT CHANGE UP (ref 0–1)
BUN SERPL-MCNC: 12 MG/DL — SIGNIFICANT CHANGE UP (ref 10–20)
BUN SERPL-MCNC: 12 MG/DL — SIGNIFICANT CHANGE UP (ref 10–20)
CALCIUM SERPL-MCNC: 8.3 MG/DL — LOW (ref 8.4–10.4)
CALCIUM SERPL-MCNC: 8.3 MG/DL — LOW (ref 8.4–10.4)
CHLORIDE SERPL-SCNC: 104 MMOL/L — SIGNIFICANT CHANGE UP (ref 98–110)
CHLORIDE SERPL-SCNC: 104 MMOL/L — SIGNIFICANT CHANGE UP (ref 98–110)
CO2 SERPL-SCNC: 22 MMOL/L — SIGNIFICANT CHANGE UP (ref 17–32)
CO2 SERPL-SCNC: 22 MMOL/L — SIGNIFICANT CHANGE UP (ref 17–32)
CREAT SERPL-MCNC: 0.6 MG/DL — LOW (ref 0.7–1.5)
CREAT SERPL-MCNC: 0.6 MG/DL — LOW (ref 0.7–1.5)
EGFR: 98 ML/MIN/1.73M2 — SIGNIFICANT CHANGE UP
EGFR: 98 ML/MIN/1.73M2 — SIGNIFICANT CHANGE UP
EOSINOPHIL # BLD AUTO: 0.24 K/UL — SIGNIFICANT CHANGE UP (ref 0–0.7)
EOSINOPHIL # BLD AUTO: 0.24 K/UL — SIGNIFICANT CHANGE UP (ref 0–0.7)
EOSINOPHIL NFR BLD AUTO: 2.9 % — SIGNIFICANT CHANGE UP (ref 0–8)
EOSINOPHIL NFR BLD AUTO: 2.9 % — SIGNIFICANT CHANGE UP (ref 0–8)
GLUCOSE SERPL-MCNC: 94 MG/DL — SIGNIFICANT CHANGE UP (ref 70–99)
GLUCOSE SERPL-MCNC: 94 MG/DL — SIGNIFICANT CHANGE UP (ref 70–99)
HCT VFR BLD CALC: 40.4 % — SIGNIFICANT CHANGE UP (ref 37–47)
HCT VFR BLD CALC: 40.4 % — SIGNIFICANT CHANGE UP (ref 37–47)
HGB BLD-MCNC: 13 G/DL — SIGNIFICANT CHANGE UP (ref 12–16)
HGB BLD-MCNC: 13 G/DL — SIGNIFICANT CHANGE UP (ref 12–16)
IMM GRANULOCYTES NFR BLD AUTO: 0.2 % — SIGNIFICANT CHANGE UP (ref 0.1–0.3)
IMM GRANULOCYTES NFR BLD AUTO: 0.2 % — SIGNIFICANT CHANGE UP (ref 0.1–0.3)
LYMPHOCYTES # BLD AUTO: 2.54 K/UL — SIGNIFICANT CHANGE UP (ref 1.2–3.4)
LYMPHOCYTES # BLD AUTO: 2.54 K/UL — SIGNIFICANT CHANGE UP (ref 1.2–3.4)
LYMPHOCYTES # BLD AUTO: 30.9 % — SIGNIFICANT CHANGE UP (ref 20.5–51.1)
LYMPHOCYTES # BLD AUTO: 30.9 % — SIGNIFICANT CHANGE UP (ref 20.5–51.1)
MAGNESIUM SERPL-MCNC: 2.3 MG/DL — SIGNIFICANT CHANGE UP (ref 1.8–2.4)
MAGNESIUM SERPL-MCNC: 2.3 MG/DL — SIGNIFICANT CHANGE UP (ref 1.8–2.4)
MCHC RBC-ENTMCNC: 27.6 PG — SIGNIFICANT CHANGE UP (ref 27–31)
MCHC RBC-ENTMCNC: 27.6 PG — SIGNIFICANT CHANGE UP (ref 27–31)
MCHC RBC-ENTMCNC: 32.2 G/DL — SIGNIFICANT CHANGE UP (ref 32–37)
MCHC RBC-ENTMCNC: 32.2 G/DL — SIGNIFICANT CHANGE UP (ref 32–37)
MCV RBC AUTO: 85.8 FL — SIGNIFICANT CHANGE UP (ref 81–99)
MCV RBC AUTO: 85.8 FL — SIGNIFICANT CHANGE UP (ref 81–99)
MONOCYTES # BLD AUTO: 0.81 K/UL — HIGH (ref 0.1–0.6)
MONOCYTES # BLD AUTO: 0.81 K/UL — HIGH (ref 0.1–0.6)
MONOCYTES NFR BLD AUTO: 9.9 % — HIGH (ref 1.7–9.3)
MONOCYTES NFR BLD AUTO: 9.9 % — HIGH (ref 1.7–9.3)
NEUTROPHILS # BLD AUTO: 4.56 K/UL — SIGNIFICANT CHANGE UP (ref 1.4–6.5)
NEUTROPHILS # BLD AUTO: 4.56 K/UL — SIGNIFICANT CHANGE UP (ref 1.4–6.5)
NEUTROPHILS NFR BLD AUTO: 55.6 % — SIGNIFICANT CHANGE UP (ref 42.2–75.2)
NEUTROPHILS NFR BLD AUTO: 55.6 % — SIGNIFICANT CHANGE UP (ref 42.2–75.2)
NRBC # BLD: 0 /100 WBCS — SIGNIFICANT CHANGE UP (ref 0–0)
NRBC # BLD: 0 /100 WBCS — SIGNIFICANT CHANGE UP (ref 0–0)
PHOSPHATE SERPL-MCNC: 3.5 MG/DL — SIGNIFICANT CHANGE UP (ref 2.1–4.9)
PHOSPHATE SERPL-MCNC: 3.5 MG/DL — SIGNIFICANT CHANGE UP (ref 2.1–4.9)
PLATELET # BLD AUTO: 268 K/UL — SIGNIFICANT CHANGE UP (ref 130–400)
PLATELET # BLD AUTO: 268 K/UL — SIGNIFICANT CHANGE UP (ref 130–400)
PMV BLD: 10.9 FL — HIGH (ref 7.4–10.4)
PMV BLD: 10.9 FL — HIGH (ref 7.4–10.4)
POTASSIUM SERPL-MCNC: 4.3 MMOL/L — SIGNIFICANT CHANGE UP (ref 3.5–5)
POTASSIUM SERPL-MCNC: 4.3 MMOL/L — SIGNIFICANT CHANGE UP (ref 3.5–5)
POTASSIUM SERPL-SCNC: 4.3 MMOL/L — SIGNIFICANT CHANGE UP (ref 3.5–5)
POTASSIUM SERPL-SCNC: 4.3 MMOL/L — SIGNIFICANT CHANGE UP (ref 3.5–5)
RBC # BLD: 4.71 M/UL — SIGNIFICANT CHANGE UP (ref 4.2–5.4)
RBC # BLD: 4.71 M/UL — SIGNIFICANT CHANGE UP (ref 4.2–5.4)
RBC # FLD: 13.9 % — SIGNIFICANT CHANGE UP (ref 11.5–14.5)
RBC # FLD: 13.9 % — SIGNIFICANT CHANGE UP (ref 11.5–14.5)
SODIUM SERPL-SCNC: 137 MMOL/L — SIGNIFICANT CHANGE UP (ref 135–146)
SODIUM SERPL-SCNC: 137 MMOL/L — SIGNIFICANT CHANGE UP (ref 135–146)
WBC # BLD: 8.21 K/UL — SIGNIFICANT CHANGE UP (ref 4.8–10.8)
WBC # BLD: 8.21 K/UL — SIGNIFICANT CHANGE UP (ref 4.8–10.8)
WBC # FLD AUTO: 8.21 K/UL — SIGNIFICANT CHANGE UP (ref 4.8–10.8)
WBC # FLD AUTO: 8.21 K/UL — SIGNIFICANT CHANGE UP (ref 4.8–10.8)

## 2023-12-26 PROCEDURE — 70450 CT HEAD/BRAIN W/O DYE: CPT | Mod: 26

## 2023-12-26 PROCEDURE — 99231 SBSQ HOSP IP/OBS SF/LOW 25: CPT

## 2023-12-26 RX ORDER — HYDRALAZINE HCL 50 MG
10 TABLET ORAL ONCE
Refills: 0 | Status: COMPLETED | OUTPATIENT
Start: 2023-12-26 | End: 2023-12-26

## 2023-12-26 RX ORDER — LABETALOL HCL 100 MG
10 TABLET ORAL ONCE
Refills: 0 | Status: COMPLETED | OUTPATIENT
Start: 2023-12-26 | End: 2023-12-26

## 2023-12-26 RX ADMIN — Medication 10 MILLIGRAM(S): at 18:25

## 2023-12-26 RX ADMIN — AMLODIPINE BESYLATE 5 MILLIGRAM(S): 2.5 TABLET ORAL at 05:16

## 2023-12-26 RX ADMIN — SENNA PLUS 2 TABLET(S): 8.6 TABLET ORAL at 22:15

## 2023-12-26 RX ADMIN — PANTOPRAZOLE SODIUM 40 MILLIGRAM(S): 20 TABLET, DELAYED RELEASE ORAL at 06:10

## 2023-12-26 RX ADMIN — POLYETHYLENE GLYCOL 3350 17 GRAM(S): 17 POWDER, FOR SOLUTION ORAL at 17:49

## 2023-12-26 RX ADMIN — Medication 100 MILLIGRAM(S): at 05:16

## 2023-12-26 RX ADMIN — CHLORHEXIDINE GLUCONATE 1 APPLICATION(S): 213 SOLUTION TOPICAL at 05:15

## 2023-12-26 RX ADMIN — Medication 100 MILLIGRAM(S): at 17:48

## 2023-12-26 RX ADMIN — ENOXAPARIN SODIUM 40 MILLIGRAM(S): 100 INJECTION SUBCUTANEOUS at 17:48

## 2023-12-26 NOTE — CHART NOTE - NSCHARTNOTEFT_GEN_A_CORE
Neurovascular:  Called for HTN SBP >200.   Examined and rechecked BP, consistently >200.  Exam stable. Preferred son at bedside for translation.  Reports deficits same as morning exam, only feeling slightly lightheaded.   Recently given PO labetolol.   Discussed with Dr. Dickson,   Hydralazine 10 mg IVP x1. Will monitor. Neurovascular:  Called for HTN SBP >200.   Examined and rechecked BP, consistently >200.  Preferred son at bedside for translation.  Exam stable, however subtle LUE ataxia present. Patient reports symptoms stable since mornings exam.  Recently given PO labetolol.   Discussed with Dr. Dickson,   Hydralazine 10 mg IVP x1. Will monitor.  CTH to be repeated.

## 2023-12-26 NOTE — DISCHARGE NOTE NURSING/CASE MANAGEMENT/SOCIAL WORK - NSDCPEFALRISK_GEN_ALL_CORE
For information on Fall & Injury Prevention, visit: [email or web address] OR  [email or web address] OR  https://www.cdc.gov/steadi/patient.html For information on Fall & Injury Prevention, visit: [email or web address] OR  [email or web address] OR  https://www.cdc.gov/steadi/patient.html

## 2023-12-26 NOTE — CONSULT NOTE ADULT - ASSESSMENT
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 She has ataxia. Her son translates at her request. She amb 200 ft RW with cg assist. The patient desires to go home with home PT/home OT and family support. She lives with her spouse. She started on an antihypertensive. She wasn't on any antihypertensives at the time of the stroke.      PRECAUTIONS: [ x ] Cardiac  [  ] Respiratory  [  ] Seizures [  ] Contact Isolation  [  ] Droplet Isolation  [ x  ] Other Lithuanian speaking    Weight Bearing Status:     RECOMMENDATION:    Out of Bed to Chair     DVT/Decubiti Prophylaxis    REHAB PLAN:     [  x ] Bedside P/T 3-5 times a week   [ x  ]   Bedside O/T  2-3 times a week             [   ] No Rehab Therapy Indicated                   [   ]  Speech Therapy   Conditioning/ROM                                    ADL  Bed Mobility                                               Conditioning/ROM  Transfers                                                     Bed Mobility  Sitting /Standing Balance                         Transfers                                        Gait Training                                               Sitting/Standing Balance  Stair Training [   ]Applicable                    Home equipment Eval                                                                        Splinting  [   ] Only      GOALS:   ADL   [  x ]   Independent                    Transfers  [  x ] Independent                          Ambulation  [  x ] Independent     [  x  ] With device                            [   ]  CG                                                         [   ]  CG                                                                  [   ] CG                            [    ] Min A                                                   [   ] Min A                                                              [   ] Min  A          DISCHARGE PLAN:   [   ]  Good candidate for Intensive Rehabilitation/Hospital based-4A [facility]                                             Will tolerate 3hrs Intensive Rehab Daily                                       [    ]  Short Term Rehab in Skilled Nursing Facility                                       [ x   ]  Home with Outpatient or VN services including home PT, OT. A RW is needed at this time for ambulation.                                          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 She has ataxia. Her son translates at her request. She amb 200 ft RW with cg assist. The patient desires to go home with home PT/home OT and family support. She lives with her spouse. She started on an antihypertensive. She wasn't on any antihypertensives at the time of the stroke.      PRECAUTIONS: [ x ] Cardiac  [  ] Respiratory  [  ] Seizures [  ] Contact Isolation  [  ] Droplet Isolation  [ x  ] Other Syriac speaking    Weight Bearing Status:     RECOMMENDATION:    Out of Bed to Chair     DVT/Decubiti Prophylaxis    REHAB PLAN:     [  x ] Bedside P/T 3-5 times a week   [ x  ]   Bedside O/T  2-3 times a week             [   ] No Rehab Therapy Indicated                   [   ]  Speech Therapy   Conditioning/ROM                                    ADL  Bed Mobility                                               Conditioning/ROM  Transfers                                                     Bed Mobility  Sitting /Standing Balance                         Transfers                                        Gait Training                                               Sitting/Standing Balance  Stair Training [   ]Applicable                    Home equipment Eval                                                                        Splinting  [   ] Only      GOALS:   ADL   [  x ]   Independent                    Transfers  [  x ] Independent                          Ambulation  [  x ] Independent     [  x  ] With device                            [   ]  CG                                                         [   ]  CG                                                                  [   ] CG                            [    ] Min A                                                   [   ] Min A                                                              [   ] Min  A          DISCHARGE PLAN:   [   ]  Good candidate for Intensive Rehabilitation/Hospital based-4A [facility]                                             Will tolerate 3hrs Intensive Rehab Daily                                       [    ]  Short Term Rehab in Skilled Nursing Facility                                       [ x   ]  Home with Outpatient or VN services including home PT, OT. A RW is needed at this time for ambulation.                                          [    ]  Possible Candidate for Intensive Hospital based Rehab

## 2023-12-26 NOTE — OCCUPATIONAL THERAPY INITIAL EVALUATION ADULT - GENERAL OBSERVATIONS, REHAB EVAL
Pt seen bedside for OT evaluation [id number]. Pt son at bedside. [facility] video  used for eval. Pt seen bedside for OT evaluation [id number]. Pt [facility] at bedside. [facility].

## 2023-12-26 NOTE — CHART NOTE - NSCHARTNOTEFT_GEN_A_CORE
Transfer from: NICU    Transfer to:  (  ) CCU    (  ) MICU   (  ) Telemetry     (  ) RCU                               (  ) Palliative    (  ) Stroke Unit    ( x ) stroke service      Follow up:  1) MRI  2)     HOSPITAL COURSE:  68F w/ PMHx of GERD, glaucoma presents for L sided numbness and LUE weakness that started on day of admission. Pt initially went to urgent care for evaluation, however SBP was measured at 190, 220 on repeat; pt was instructed to go to ED. SBP in 220s in ED. CTH showed 1.3cm R thalamic IPH. Pt denies any AC/AP. Pt given Labetalol IVP and started on Nicardipine gtt. While in NSICU arterial line was placed for treatment of hypertensive emergency. Repeat CTH showed stability. Pt was titrated off of cardene while maintaining a stable neurological exam. Pt transitioned to PO labetolol and amlodipine for HTN.     Neurological:  - Mental Status: AAOx3; speech is fluent with intact naming, repetition, and comprehension  - Cranial Nerves II -XII:  II: PERRLA; visual fields are full to confrontation  III, IV, VI: EOMI, no nystagmus appreciated  V: facial sensation intact to light touch V1-V3 b/l  VII: subtle left facial droop  VIII: hearing intact to finger rub b/l  XI: head turning and shoulder shrug intact b/l  XII: tongue protrusion midline  - Motor: strength is 5/5 b/l LLE & RLE, 5/5 b/l LUE & RUE. normal muscle bulk and tone throughout, no pronator drift  - Sensory: decreased sensation on the left side  - Cerebellum: No dysmetria in FTN  - Gait: deferred    NIHSS: 2 (subtle left facial, less sensation on the left side of the body) Transfer from: NICU    Transfer to:  (  ) CCU    (  ) MICU   (  ) Telemetry     (  ) RCU                               (  ) Palliative    (  ) Stroke Unit    ( x ) stroke service      Follow up:  1) MRI  2) PT/OT  3) physiatry and dispo planning    HOSPITAL COURSE:  68F w/ PMHx of GERD, glaucoma presents for L sided numbness and LUE weakness that started on day of admission. Pt initially went to urgent care for evaluation, however SBP was measured at 190, 220 on repeat; pt was instructed to go to ED. SBP in 220s in ED. CTH showed 1.3cm R thalamic IPH. Pt denies any AC/AP. Pt given Labetalol IVP and started on Nicardipine gtt. While in NSICU arterial line was placed for treatment of hypertensive emergency. Repeat CTH showed stability. Pt was titrated off of cardene while maintaining a stable neurological exam. Pt transitioned to PO labetolol and amlodipine for HTN.     Neurological:  - Mental Status: AAOx3; speech is fluent with intact naming, repetition, and comprehension  - Cranial Nerves II -XII:  II: PERRLA; visual fields are full to confrontation  III, IV, VI: EOMI, no nystagmus appreciated  V: facial sensation intact to light touch V1-V3 b/l  VII: subtle left facial droop  VIII: hearing intact to finger rub b/l  XI: head turning and shoulder shrug intact b/l  XII: tongue protrusion midline  - Motor: strength is 5/5 b/l LLE & RLE, 5/5 b/l LUE & RUE. normal muscle bulk and tone throughout, no pronator drift  - Sensory: decreased sensation on the left side  - Cerebellum: No dysmetria in FTN  - Gait: deferred    NIHSS: 2 (subtle left facial, less sensation on the left side of the body) Transfer from: NICU    Transfer to:  (  ) CCU   (  ) MICU   (  ) Telemetry     (  ) RCU                               (  ) Palliative    (  ) Stroke Unit    ( x ) stroke service      Follow up:  1) MRI  2) PT/OT  3) physiatry and dispo planning    HOSPITAL COURSE:  68F w/ PMHx of GERD, glaucoma presents for L sided numbness and LUE weakness that started on day of admission. Pt initially went to urgent care for evaluation, however SBP was measured at 190, 220 on repeat; pt was instructed to go to ED. SBP in 220s in ED. CTH showed 1.3cm R thalamic IPH. Pt denies any AC/AP. Pt given Labetalol IVP and started on Nicardipine gtt. While in NSICU arterial line was placed for treatment of hypertensive emergency. Repeat CTH showed stability. Pt was titrated off of cardene while maintaining a stable neurological exam. Pt transitioned to PO labetolol and amlodipine for HTN.     Neurological:  - Mental Status: AAOx3; speech is fluent with intact naming, repetition, and comprehension  - Cranial Nerves II -XII:  II: PERRLA; visual fields are full to confrontation  III, IV, VI: EOMI, no nystagmus appreciated  V: facial sensation intact to light touch V1-V3 b/l  VII: subtle left facial droop  VIII: hearing intact to finger rub b/l  XI: head turning and shoulder shrug intact b/l  XII: tongue protrusion midline  - Motor: strength is 5/5 b/l LLE & RLE, 5/5 b/l LUE & RUE. normal muscle bulk and tone throughout, no pronator drift  - Sensory: decreased sensation on the left side  - Cerebellum: No dysmetria in FTN  - Gait: deferred    NIHSS: 2 (subtle left facial, less sensation on the left side of the body) Transfer from: NICU    Transfer to:  (  ) CCU   (  ) MICU   (  ) Telemetry     (  ) RCU                               (  ) Palliative    (  ) Stroke Unit    ( x ) stroke service      Follow up:  1) MRI  2) PT/OT  3) physiatry and dispo planning    HOSPITAL COURSE:  68F w/ PMHx of GERD, glaucoma presents for L sided numbness and LUE weakness that started on day of admission. Pt initially went to urgent care for evaluation, however SBP was measured at 190, 220 on repeat; pt was instructed to go to ED. SBP in 220s in ED. CTH showed 1.3cm R thalamic IPH. Pt denies any AC/AP. Pt given Labetalol IVP and started on Nicardipine gtt. While in NSICU arterial line was placed for treatment of hypertensive emergency. Repeat CTH showed stability. Pt was titrated off of cardene while maintaining a stable neurological exam. Pt transitioned to PO labetolol and amlodipine for HTN.     Neurological:  - Mental Status: AAOx3; speech is fluent with intact naming, repetition, and comprehension  - Cranial Nerves II -XII:  II: PERRLA; visual fields are full to confrontation  III, IV, VI: EOMI, no nystagmus appreciated  V: facial sensation intact to light touch V1-V3 b/l  VII: subtle left facial droop  VIII: hearing intact to finger rub b/l  XI: head turning and shoulder shrug intact b/l  XII: tongue protrusion midline  - Motor: strength is 5/5 b/l LLE & RLE, 5/5 b/l LUE & RUE. normal muscle bulk and tone throughout, no pronator drift  - Sensory: decreased sensation on the left side  - Cerebellum: No dysmetria in FTN  - Gait: deferred    NIHSS: 2 (subtle left facial, less sensation on the left side of the body)      Stroke attending attestation:  Right thalamic IPH 2/2 hypertensive angiopathy. Maintain -150, PT/OT/ST, dispo planning

## 2023-12-26 NOTE — DISCHARGE NOTE NURSING/CASE MANAGEMENT/SOCIAL WORK - PATIENT PORTAL LINK FT
You can access the FollowMyHealth Patient Portal offered by [facility] by registering at the following website: [email or web address]. By joining [employer]’s FollowMyHealth portal, you will also be able to view your health information using other applications (apps) compatible with our system. You can access the FollowMyHealth Patient Portal offered by [facility] by registering at the following website: [email or web address]. By joining [employer]’s FollowMyHealth portal, you will also be able to view your health information using other applications (apps) compatible with our system.

## 2023-12-26 NOTE — PROGRESS NOTE ADULT - SUBJECTIVE AND OBJECTIVE BOX
SUMMARY:    68F w/ PMHx of GERD, glaucoma presents for L sided numbness and LUE weakness that started this morning at 10am. Pt initially went to urgent care for evaluation, however SBP was measured at 190, 220 on repeat; pt was instructed to go to ED. SBP in 220s in ED. CTH showed 1.3cm R thalamic IPH. Pt denies any AC/AP. Pt given Labetalol IVP and started on Nicardipine gtt.      OVERNIGHT EVENTS: NAEO    ADMISSION SCORES:   GCS: 15 ICH Score: 0    SEDATION SCORES:  RASS: CAM-ICU:     REVIEW OF SYSTEMS: Patient endorses left facial numbness; denies headache, nausea/vomiting, blurred vision, double vision, or dizziness. Otherwise, 10-point ROS is negative.     VITALS: [X] Reviewed    IMAGING/DATA: [X] Reviewed    IVF FLUIDS/MEDICATIONS: [X] Reviewed    ALLERGIES: Allergies    No Known Allergies    Intolerances      EXAMINATION:  General: No acute distress  HEENT: Anicteric sclerae  Cardiac: [id number]  Lungs: Clear  Abdomen: Soft, non-tender, +BS  Extremities: No c/c/e  Skin/Incision Site: Clean, dry and intact  Neurologic: Awake, alert, oriented x3, fluent speech, follows on all four, mild left facial droop, L eye chronic blindness, eomi, L face and arm hemiesthesia, motor strength 5/5 no drift      ICU Vital Signs Last 24 Hrs  T(C): 36.9 (26 Dec 2023 10:42), Max: 37.1 (25 Dec 2023 16:00)  T(F): 98.5 (26 Dec 2023 10:42), Max: 98.7 (25 Dec 2023 16:00)  HR: 86 (26 Dec 2023 10:42) (76 - 98)  BP: 117/56 (26 Dec 2023 10:42) (94/67 - 163/75)  BP(mean): 99 (26 Dec 2023 09:00) (69 - 108)  ABP: --  ABP(mean): --  RR: 18 (26 Dec 2023 10:42) (15 - 40)  SpO2: 95% (26 Dec 2023 10:42) (95% - 100%)      12-25-23 @ 07:01  -  12-26-23 @ 07:00  --------------------------------------------------------  IN: 0 mL / OUT: 2135 mL / NET: -2135 mL    12-26-23 @ 07:01  -  12-26-23 @ 10:58  --------------------------------------------------------  IN: 360 mL / OUT: 0 mL / NET: 360 mL            acetaminophen     Tablet .. 650 milliGRAM(s) Oral every 6 hours PRN  amLODIPine   Tablet 5 milliGRAM(s) Oral daily  benzocaine 20% Spray 1 Spray(s) Topical every 6 hours PRN  chlorhexidine 2% Cloths 1 Application(s) Topical <User Schedule>  dorzolamide 2%/timolol 0.5% Ophthalmic Solution 1 Drop(s) Left EYE every 12 hours  enoxaparin Injectable 40 milliGRAM(s) SubCutaneous every 24 hours  hydrALAZINE Injectable 5 milliGRAM(s) IV Push every 2 hours PRN  labetalol 100 milliGRAM(s) Oral every 12 hours  labetalol Injectable 10 milliGRAM(s) IV Push every 2 hours PRN  ondansetron Injectable 4 milliGRAM(s) IV Push once  pantoprazole    Tablet 40 milliGRAM(s) Oral before breakfast  polyethylene glycol 3350 17 Gram(s) Oral two times a day  senna 2 Tablet(s) Oral at bedtime      LABS:  Na: 137 (12-26 @ 05:10), 137 (12-25 @ 04:16), 141 (12-24 @ 04:40)  K: 4.3 (12-26 @ 05:10), 4.1 (12-25 @ 04:16), 3.9 (12-24 @ 04:40)  Cl: 104 (12-26 @ 05:10), 104 (12-25 @ 04:16), 106 (12-24 @ 04:40)  CO2: 22 (12-26 @ 05:10), 22 (12-25 @ 04:16), 24 (12-24 @ 04:40)  BUN: 12 (12-26 @ 05:10), 14 (12-25 @ 04:16), 14 (12-24 @ 04:40)  Cr: 0.6 (12-26 @ 05:10), 0.6 (12-25 @ 04:16), 0.6 (12-24 @ 04:40)  Glu: 94(12-26 @ 05:10), 100(12-25 @ 04:16), 94(12-24 @ 04:40)    Hgb: 13.0 (12-26 @ 05:10), 12.9 (12-25 @ 04:16), 13.0 (12-24 @ 04:40)  Hct: 40.4 (12-26 @ 05:10), 40.1 (12-25 @ 04:16), 40.8 (12-24 @ 04:40)  WBC: 8.21 (12-26 @ 05:10), 9.38 (12-25 @ 04:16), 9.25 (12-24 @ 04:40)  Plt: 268 (12-26 @ 05:10), 265 (12-25 @ 04:16), 279 (12-24 @ 04:40)    INR:   PTT:           LIVER FUNCTIONS - ( 25 Dec 2023 04:16 )  Alb: 3.8 g/dL / Pro: 6.4 g/dL / ALK PHOS: 86 U/L / ALT: 11 U/L / AST: 12 U/L / GGT: x                  SUMMARY:    68F w/ PMHx of GERD, glaucoma presents for L sided numbness and LUE weakness that started this morning at 10am. Pt initially went to urgent care for evaluation, however SBP was measured at 190, 220 on repeat; pt was instructed to go to ED. SBP in 220s in ED. CTH showed 1.3cm R thalamic IPH. Pt denies any AC/AP. Pt given Labetalol IVP and started on Nicardipine gtt.      OVERNIGHT EVENTS: NAEO    ADMISSION SCORES:   GCS: 15 ICH Score: 0    SEDATION SCORES:  RASS: CAM-ICU:     REVIEW OF SYSTEMS: Patient endorses left facial numbness; denies headache, nausea/vomiting, blurred vision, double vision, or dizziness. Otherwise, 10-point ROS is negative.     VITALS: [X] Reviewed    IMAGING/DATA: [X] Reviewed    IVF FLUIDS/MEDICATIONS: [X] Reviewed    ALLERGIES: Allergies    No Known Allergies    Intolerances      EXAMINATION:  General: No acute distress  HEENT: Anicteric sclerae  Cardiac: [id number]  Lungs: Clear  Abdomen: Soft, non-tender, +BS  Extremities: No c/c/e  Skin/Incision Site: Clean, dry and intact  Neurologic: Awake, alert, oriented x3, fluent speech, follows on all four, mild left facial droop, L eye chronic blindness, eomi, L face and arm hemiesthesia, motor strength 5/5 no drift      ICU Vital Signs Last 24 Hrs  T(C): 36.9 (26 Dec 2023 10:42), Max: 37.1 (25 Dec 2023 16:00)  T(F): 98.5 (26 Dec 2023 10:42), Max: 98.7 (25 Dec 2023 16:00)  HR: 86 (26 Dec 2023 10:42) (76 - 98)  BP: 117/56 (26 Dec 2023 10:42) (94/67 - 163/75)  BP(mean): 99 (26 Dec 2023 09:00) (69 - 108)  ABP: --  ABP(mean): --  RR: 18 (26 Dec 2023 10:42) (15 - 40)  SpO2: 95% (26 Dec 2023 10:42) (95% - 100%)      12-25-23 @ 07:01  -  12-26-23 @ 07:00  --------------------------------------------------------  IN: 0 mL / OUT: 2135 mL / NET: -2135 mL    12-26-23 @ 07:01  -  12-26-23 @ 10:58  --------------------------------------------------------  IN: 360 mL / OUT: 0 mL / NET: 360 mL            acetaminophen     Tablet .. 650 milliGRAM(s) Oral every 6 hours PRN  amLODIPine   Tablet 5 milliGRAM(s) Oral daily  benzocaine 20% Spray 1 Spray(s) Topical every 6 hours PRN  chlorhexidine 2% Cloths 1 Application(s) Topical <User Schedule>  dorzolamide 2%/timolol 0.5% Ophthalmic Solution 1 Drop(s) Left EYE every 12 hours  enoxaparin Injectable 40 milliGRAM(s) SubCutaneous every 24 hours  hydrALAZINE Injectable 5 milliGRAM(s) IV Push every 2 hours PRN  labetalol 100 milliGRAM(s) Oral every 12 hours  labetalol Injectable 10 milliGRAM(s) IV Push every 2 hours PRN  ondansetron Injectable 4 milliGRAM(s) IV Push once  pantoprazole    Tablet 40 milliGRAM(s) Oral before breakfast  polyethylene glycol 3350 17 Gram(s) Oral two times a day  senna 2 Tablet(s) Oral at bedtime      LABS:  Na: 137 (12-26 @ 05:10), 137 (12-25 @ 04:16), 141 (12-24 @ 04:40)  K: 4.3 (12-26 @ 05:10), 4.1 (12-25 @ 04:16), 3.9 (12-24 @ 04:40)  Cl: 104 (12-26 @ 05:10), 104 (12-25 @ 04:16), 106 (12-24 @ 04:40)  CO2: 22 (12-26 @ 05:10), 22 (12-25 @ 04:16), 24 (12-24 @ 04:40)  BUN: 12 (12-26 @ 05:10), 14 (12-25 @ 04:16), 14 (12-24 @ 04:40)  Cr: 0.6 (12-26 @ 05:10), 0.6 (12-25 @ 04:16), 0.6 (12-24 @ 04:40)  Glu: 94(12-26 @ 05:10), 100(12-25 @ 04:16), 94(12-24 @ 04:40)    Hgb: 13.0 (12-26 @ 05:10), 12.9 (12-25 @ 04:16), 13.0 (12-24 @ 04:40)  Hct: 40.4 (12-26 @ 05:10), 40.1 (12-25 @ 04:16), 40.8 (12-24 @ 04:40)  WBC: 8.21 (12-26 @ 05:10), 9.38 (12-25 @ 04:16), 9.25 (12-24 @ 04:40)  Plt: 268 (12-26 @ 05:10), 265 (12-25 @ 04:16), 279 (12-24 @ 04:40)    INR:   PTT:           LIVER FUNCTIONS - ( 25 Dec 2023 04:16 )  Alb: 3.8 g/dL / Pro: 6.4 g/dL / ALK PHOS: 86 U/L / ALT: 11 U/L / AST: 12 U/L / GGT: x

## 2023-12-26 NOTE — CONSULT NOTE ADULT - SUBJECTIVE AND OBJECTIVE BOX
HPI:  68F w/ PMHx of GERD, glaucoma presents for L sided numbness and LUE weakness that started this morning at 10am. Pt initially went to urgent care for evaluation, however SBP was measured at 190, 220 on repeat; pt was instructed to go to ED. SBP in 220s in ED. CTH showed 1.3cm R thalamic IPH. Pt denies any AC/AP. Pt given Labetalol IVP and started on Nicardipine gtt.      Admission scores:  ICH: 0 (22 Dec 2023 17:42)      PAST MEDICAL & SURGICAL HISTORY:  GERD (gastroesophageal reflux disease)      Glaucoma          Hospital Course:    TODAY'S SUBJECTIVE & REVIEW OF SYMPTOMS:     Constitutional WNL   Cardio WNL   Resp WNL   GI WNL  Heme WNL  Endo WNL  Skin WNL  MSK WNL  Neuro WNL  Cognitive WNL  Psych WNL      MEDICATIONS  (STANDING):  amLODIPine   Tablet 5 milliGRAM(s) Oral daily  chlorhexidine 2% Cloths 1 Application(s) Topical <User Schedule>  dorzolamide 2%/timolol 0.5% Ophthalmic Solution 1 Drop(s) Left EYE every 12 hours  enoxaparin Injectable 40 milliGRAM(s) SubCutaneous every 24 hours  labetalol 100 milliGRAM(s) Oral every 12 hours  ondansetron Injectable 4 milliGRAM(s) IV Push once  pantoprazole    Tablet 40 milliGRAM(s) Oral before breakfast  polyethylene glycol 3350 17 Gram(s) Oral two times a day  senna 2 Tablet(s) Oral at bedtime    MEDICATIONS  (PRN):  acetaminophen     Tablet .. 650 milliGRAM(s) Oral every 6 hours PRN Temp greater or equal to 38C (100.4F), Mild Pain (1 - 3)  benzocaine 20% Spray 1 Spray(s) Topical every 6 hours PRN sore throat  hydrALAZINE Injectable 5 milliGRAM(s) IV Push every 2 hours PRN sbp >180  labetalol Injectable 10 milliGRAM(s) IV Push every 2 hours PRN sbp >180      FAMILY HISTORY:      Allergies    No Known Allergies    Intolerances        SOCIAL HISTORY:    [  ] Etoh  [  ] Smoking  [  ] Substance abuse     Home Environment:  [  ] Home Alone  [  ] Lives with Family  [  ] Home Health Aid    Dwelling:  [  ] Apartment  [  ] Private House  [  ] Adult Home  [  ] Skilled Nursing Facility      [  ] Short Term  [  ] Long Term  [  ] Stairs       Elevator [  ]    FUNCTIONAL STATUS PTA: (Check all that apply)  Ambulation: [   ]Independent    [  ] Dependent     [  ] Non-Ambulatory  Assistive Device: [  ] SA Cane  [  ]  Q Cane  [  ] Walker  [  ]  Wheelchair  ADL : [  ] Independent  [  ]  Dependent       Vital Signs Last 24 Hrs  T(C): 36.9 (26 Dec 2023 10:42), Max: 37.1 (25 Dec 2023 16:00)  T(F): 98.5 (26 Dec 2023 10:42), Max: 98.7 (25 Dec 2023 16:00)  HR: 86 (26 Dec 2023 10:42) (76 - 98)  BP: 117/56 (26 Dec 2023 10:42) (94/67 - 163/75)  BP(mean): 99 (26 Dec 2023 09:00) (69 - 108)  RR: 18 (26 Dec 2023 10:42) (16 - 40)  SpO2: 95% (26 Dec 2023 10:42) (95% - 100%)    Parameters below as of 26 Dec 2023 10:00  Patient On (Oxygen Delivery Method): room air          PHYSICAL EXAM: Alert & Oriented X3  GENERAL: NAD, well-groomed, well-developed  HEAD:  Atraumatic, Normocephalic  EYES: EOMI, PERRLA, conjunctiva and sclera clear  NECK: Supple, No JVD, Normal thyroid  CHEST/LUNG: Clear  bilaterally; No rales, rhonchi, wheezing, or rubs  HEART: Regular rate and rhythm; No murmurs, rubs, or gallops  ABDOMEN: Soft, Nontender, Nondistended; Bowel sounds present  EXTREMITIES:  2+ Peripheral Pulses, No clubbing, cyanosis, or edema    NERVOUS SYSTEM:  Cranial Nerves 2-12 intact [  ] Abnormal  [  ]  ROM: WFL all extremities [  ]  Abnormal [  ]  Motor Strength: WFL all extremities  [ x ]  Abnormal [  ]  + ataxia  Sensation: intact to light touch [  ] Abnormal [x  ]decreased sens left face, LUE and LLE.   Reflexes: Symmetric [  ]  Abnormal [  ]    FUNCTIONAL STATUS:  Bed Mobility: Independent [  ]  Supervision [  ]  Needs Assistance [  ]  N/A [  ]  Transfers: Independent [  ]  Supervision [  ]  Needs Assistance [  ]  N/A [  ]   Ambulation: Independent [  ]  Supervision [  ]  Needs Assistance [  ]  N/A [  ]  ADL: Independent [  ] Requires Assistance [  ] N/A [  ]      LABS:                        13.0   8.21  )-----------( 268      ( 26 Dec 2023 05:10 )             40.4     12-26    137  |  104  |  12  ----------------------------<  94  4.3   |  22  |  0.6<L>    Ca    8.3<L>      26 Dec 2023 05:10  Phos  3.5     12-26  Mg     2.3     12-26    TPro  6.4  /  Alb  3.8  /  TBili  0.4  /  DBili  x   /  AST  12  /  ALT  11  /  AlkPhos  86  12-25      Urinalysis Basic - ( 26 Dec 2023 05:10 )    Color: x / Appearance: x / SG: x / pH: x  Gluc: 94 mg/dL / Ketone: x  / Bili: x / Urobili: x   Blood: x / Protein: x / Nitrite: x   Leuk Esterase: x / RBC: x / WBC x   Sq Epi: x / Non Sq Epi: x / Bacteria: x        RADIOLOGY & ADDITIONAL STUDIES:    Assesment:

## 2023-12-26 NOTE — PROGRESS NOTE ADULT - NS ATTEND AMEND GEN_ALL_CORE FT
59yo woman with h/o GERD and glaucoma, not on any antihypertensive meds as home, admitted 12/22 with L arm numbness in setting of hypertensive emergency, CTH with a small R thalamic ICH. Most recent CTH stable. BP well controlled on labetalol.   Hospital course otherwise notable for rhinovirus positive.     On exam, alert, speech is fluent, briskly follows commands, face symmetric, with L facial numbness, 5/5 in all 4 extremities, able to walk with walker.    Assessment: Etiology of ICH is HTN. ICH score 0. Stable for floor     -160 for hypertensive ICH  add amlodipine 5 mg daily to wean labetalol   LBM 12/25  Lov ppx     I personally updated the patient's  and sons at bedside.    Patient seen and examined by attending on 12/25/2023.    Patient is not critically ill but is medically complex due to ICH and titrating meds for HTN. 61yo woman with h/o GERD and glaucoma, not on any antihypertensive meds as home, admitted 12/22 with L arm numbness in setting of hypertensive emergency, CTH with a small R thalamic ICH. Most recent CTH stable. BP well controlled on labetalol.   Hospital course otherwise notable for rhinovirus positive.     On exam, alert, speech is fluent, briskly follows commands, face symmetric, with L facial numbness, 5/5 in all 4 extremities, able to walk with walker.    Assessment: Etiology of ICH is HTN. ICH score 0. Stable for floor     -160 for hypertensive ICH  add amlodipine 5 mg daily to wean labetalol   LBM 12/25  Lov ppx     I personally updated the patient's  and sons at bedside.    Patient seen and examined by attending on 12/25/2023.    Patient is not critically ill but is medically complex due to ICH and titrating meds for HTN. 59yo woman with h/o GERD and glaucoma, not on any antihypertensive meds as home, admitted 12/22 with L arm numbness in setting of hypertensive emergency, CTH with a small R thalamic ICH. Most recent CTH stable. BP now well controlled on oral antihypertensives.   Hospital course otherwise notable for rhinovirus positive.   Today patient continues to report L facial numbness.     On exam, alert, speech is fluent, briskly follows commands, with L facial numbness, mild L nasolabial flattening, 5/5 in all 4 extremities.     Assessment: Etiology of ICH is HTN. ICH score 0. Stable for floor or discharge.     -160 for hypertensive ICH  on amlodipine 5 mg daily to wean labetalol 100 mg BID  LBM 12/26  PPI home med   Lov ppx     Patient seen and examined by attending on 12/26/2023.    Patient is not critically ill but is medically complex due to ICH and titrating meds for HTN. 61yo woman with h/o GERD and glaucoma, not on any antihypertensive meds as home, admitted 12/22 with L arm numbness in setting of hypertensive emergency, CTH with a small R thalamic ICH. Most recent CTH stable. BP now well controlled on oral antihypertensives.   Hospital course otherwise notable for rhinovirus positive.   Today patient continues to report L facial numbness.     On exam, alert, speech is fluent, briskly follows commands, with L facial numbness, mild L nasolabial flattening, 5/5 in all 4 extremities.     Assessment: Etiology of ICH is HTN. ICH score 0. Stable for floor or discharge.     -160 for hypertensive ICH  on amlodipine 5 mg daily to wean labetalol 100 mg BID  LBM 12/26  PPI home med   Lov ppx     Patient seen and examined by attending on 12/26/2023.    Patient is not critically ill but is medically complex due to ICH and titrating meds for HTN.

## 2023-12-27 ENCOUNTER — TRANSCRIPTION ENCOUNTER (OUTPATIENT)
Age: 68
End: 2023-12-27

## 2023-12-27 LAB
ALBUMIN SERPL ELPH-MCNC: 3.9 G/DL — SIGNIFICANT CHANGE UP (ref 3.5–5.2)
ALBUMIN SERPL ELPH-MCNC: 3.9 G/DL — SIGNIFICANT CHANGE UP (ref 3.5–5.2)
ALP SERPL-CCNC: 94 U/L — SIGNIFICANT CHANGE UP (ref 30–115)
ALP SERPL-CCNC: 94 U/L — SIGNIFICANT CHANGE UP (ref 30–115)
ALT FLD-CCNC: 13 U/L — SIGNIFICANT CHANGE UP (ref 0–41)
ALT FLD-CCNC: 13 U/L — SIGNIFICANT CHANGE UP (ref 0–41)
ANION GAP SERPL CALC-SCNC: 12 MMOL/L — SIGNIFICANT CHANGE UP (ref 7–14)
ANION GAP SERPL CALC-SCNC: 12 MMOL/L — SIGNIFICANT CHANGE UP (ref 7–14)
AST SERPL-CCNC: 12 U/L — SIGNIFICANT CHANGE UP (ref 0–41)
AST SERPL-CCNC: 12 U/L — SIGNIFICANT CHANGE UP (ref 0–41)
BASOPHILS # BLD AUTO: 0.05 K/UL — SIGNIFICANT CHANGE UP (ref 0–0.2)
BASOPHILS # BLD AUTO: 0.05 K/UL — SIGNIFICANT CHANGE UP (ref 0–0.2)
BASOPHILS NFR BLD AUTO: 0.6 % — SIGNIFICANT CHANGE UP (ref 0–1)
BASOPHILS NFR BLD AUTO: 0.6 % — SIGNIFICANT CHANGE UP (ref 0–1)
BILIRUB SERPL-MCNC: 0.3 MG/DL — SIGNIFICANT CHANGE UP (ref 0.2–1.2)
BILIRUB SERPL-MCNC: 0.3 MG/DL — SIGNIFICANT CHANGE UP (ref 0.2–1.2)
BUN SERPL-MCNC: 11 MG/DL — SIGNIFICANT CHANGE UP (ref 10–20)
BUN SERPL-MCNC: 11 MG/DL — SIGNIFICANT CHANGE UP (ref 10–20)
CALCIUM SERPL-MCNC: 9 MG/DL — SIGNIFICANT CHANGE UP (ref 8.4–10.5)
CALCIUM SERPL-MCNC: 9 MG/DL — SIGNIFICANT CHANGE UP (ref 8.4–10.5)
CHLORIDE SERPL-SCNC: 106 MMOL/L — SIGNIFICANT CHANGE UP (ref 98–110)
CHLORIDE SERPL-SCNC: 106 MMOL/L — SIGNIFICANT CHANGE UP (ref 98–110)
CO2 SERPL-SCNC: 23 MMOL/L — SIGNIFICANT CHANGE UP (ref 17–32)
CO2 SERPL-SCNC: 23 MMOL/L — SIGNIFICANT CHANGE UP (ref 17–32)
CREAT SERPL-MCNC: 0.5 MG/DL — LOW (ref 0.7–1.5)
CREAT SERPL-MCNC: 0.5 MG/DL — LOW (ref 0.7–1.5)
EGFR: 102 ML/MIN/1.73M2 — SIGNIFICANT CHANGE UP
EGFR: 102 ML/MIN/1.73M2 — SIGNIFICANT CHANGE UP
EOSINOPHIL # BLD AUTO: 0.14 K/UL — SIGNIFICANT CHANGE UP (ref 0–0.7)
EOSINOPHIL # BLD AUTO: 0.14 K/UL — SIGNIFICANT CHANGE UP (ref 0–0.7)
EOSINOPHIL NFR BLD AUTO: 1.8 % — SIGNIFICANT CHANGE UP (ref 0–8)
EOSINOPHIL NFR BLD AUTO: 1.8 % — SIGNIFICANT CHANGE UP (ref 0–8)
GLUCOSE SERPL-MCNC: 103 MG/DL — HIGH (ref 70–99)
GLUCOSE SERPL-MCNC: 103 MG/DL — HIGH (ref 70–99)
HCT VFR BLD CALC: 42.1 % — SIGNIFICANT CHANGE UP (ref 37–47)
HCT VFR BLD CALC: 42.1 % — SIGNIFICANT CHANGE UP (ref 37–47)
HGB BLD-MCNC: 13.6 G/DL — SIGNIFICANT CHANGE UP (ref 12–16)
HGB BLD-MCNC: 13.6 G/DL — SIGNIFICANT CHANGE UP (ref 12–16)
IMM GRANULOCYTES NFR BLD AUTO: 0.1 % — SIGNIFICANT CHANGE UP (ref 0.1–0.3)
IMM GRANULOCYTES NFR BLD AUTO: 0.1 % — SIGNIFICANT CHANGE UP (ref 0.1–0.3)
LYMPHOCYTES # BLD AUTO: 2.16 K/UL — SIGNIFICANT CHANGE UP (ref 1.2–3.4)
LYMPHOCYTES # BLD AUTO: 2.16 K/UL — SIGNIFICANT CHANGE UP (ref 1.2–3.4)
LYMPHOCYTES # BLD AUTO: 27.5 % — SIGNIFICANT CHANGE UP (ref 20.5–51.1)
LYMPHOCYTES # BLD AUTO: 27.5 % — SIGNIFICANT CHANGE UP (ref 20.5–51.1)
MAGNESIUM SERPL-MCNC: 2.2 MG/DL — SIGNIFICANT CHANGE UP (ref 1.8–2.4)
MAGNESIUM SERPL-MCNC: 2.2 MG/DL — SIGNIFICANT CHANGE UP (ref 1.8–2.4)
MCHC RBC-ENTMCNC: 27.9 PG — SIGNIFICANT CHANGE UP (ref 27–31)
MCHC RBC-ENTMCNC: 27.9 PG — SIGNIFICANT CHANGE UP (ref 27–31)
MCHC RBC-ENTMCNC: 32.3 G/DL — SIGNIFICANT CHANGE UP (ref 32–37)
MCHC RBC-ENTMCNC: 32.3 G/DL — SIGNIFICANT CHANGE UP (ref 32–37)
MCV RBC AUTO: 86.4 FL — SIGNIFICANT CHANGE UP (ref 81–99)
MCV RBC AUTO: 86.4 FL — SIGNIFICANT CHANGE UP (ref 81–99)
MONOCYTES # BLD AUTO: 0.69 K/UL — HIGH (ref 0.1–0.6)
MONOCYTES # BLD AUTO: 0.69 K/UL — HIGH (ref 0.1–0.6)
MONOCYTES NFR BLD AUTO: 8.8 % — SIGNIFICANT CHANGE UP (ref 1.7–9.3)
MONOCYTES NFR BLD AUTO: 8.8 % — SIGNIFICANT CHANGE UP (ref 1.7–9.3)
NEUTROPHILS # BLD AUTO: 4.8 K/UL — SIGNIFICANT CHANGE UP (ref 1.4–6.5)
NEUTROPHILS # BLD AUTO: 4.8 K/UL — SIGNIFICANT CHANGE UP (ref 1.4–6.5)
NEUTROPHILS NFR BLD AUTO: 61.2 % — SIGNIFICANT CHANGE UP (ref 42.2–75.2)
NEUTROPHILS NFR BLD AUTO: 61.2 % — SIGNIFICANT CHANGE UP (ref 42.2–75.2)
NRBC # BLD: 0 /100 WBCS — SIGNIFICANT CHANGE UP (ref 0–0)
NRBC # BLD: 0 /100 WBCS — SIGNIFICANT CHANGE UP (ref 0–0)
PHOSPHATE SERPL-MCNC: 4 MG/DL — SIGNIFICANT CHANGE UP (ref 2.1–4.9)
PHOSPHATE SERPL-MCNC: 4 MG/DL — SIGNIFICANT CHANGE UP (ref 2.1–4.9)
PLATELET # BLD AUTO: 281 K/UL — SIGNIFICANT CHANGE UP (ref 130–400)
PLATELET # BLD AUTO: 281 K/UL — SIGNIFICANT CHANGE UP (ref 130–400)
PMV BLD: 10.8 FL — HIGH (ref 7.4–10.4)
PMV BLD: 10.8 FL — HIGH (ref 7.4–10.4)
POTASSIUM SERPL-MCNC: 4.2 MMOL/L — SIGNIFICANT CHANGE UP (ref 3.5–5)
POTASSIUM SERPL-MCNC: 4.2 MMOL/L — SIGNIFICANT CHANGE UP (ref 3.5–5)
POTASSIUM SERPL-SCNC: 4.2 MMOL/L — SIGNIFICANT CHANGE UP (ref 3.5–5)
POTASSIUM SERPL-SCNC: 4.2 MMOL/L — SIGNIFICANT CHANGE UP (ref 3.5–5)
PROT SERPL-MCNC: 6.7 G/DL — SIGNIFICANT CHANGE UP (ref 6–8)
PROT SERPL-MCNC: 6.7 G/DL — SIGNIFICANT CHANGE UP (ref 6–8)
RBC # BLD: 4.87 M/UL — SIGNIFICANT CHANGE UP (ref 4.2–5.4)
RBC # BLD: 4.87 M/UL — SIGNIFICANT CHANGE UP (ref 4.2–5.4)
RBC # FLD: 14 % — SIGNIFICANT CHANGE UP (ref 11.5–14.5)
RBC # FLD: 14 % — SIGNIFICANT CHANGE UP (ref 11.5–14.5)
SODIUM SERPL-SCNC: 141 MMOL/L — SIGNIFICANT CHANGE UP (ref 135–146)
SODIUM SERPL-SCNC: 141 MMOL/L — SIGNIFICANT CHANGE UP (ref 135–146)
WBC # BLD: 7.85 K/UL — SIGNIFICANT CHANGE UP (ref 4.8–10.8)
WBC # BLD: 7.85 K/UL — SIGNIFICANT CHANGE UP (ref 4.8–10.8)
WBC # FLD AUTO: 7.85 K/UL — SIGNIFICANT CHANGE UP (ref 4.8–10.8)
WBC # FLD AUTO: 7.85 K/UL — SIGNIFICANT CHANGE UP (ref 4.8–10.8)

## 2023-12-27 PROCEDURE — 70551 MRI BRAIN STEM W/O DYE: CPT | Mod: 26

## 2023-12-27 PROCEDURE — 99232 SBSQ HOSP IP/OBS MODERATE 35: CPT

## 2023-12-27 PROCEDURE — 99233 SBSQ HOSP IP/OBS HIGH 50: CPT

## 2023-12-27 RX ORDER — LABETALOL HCL 100 MG
100 TABLET ORAL EVERY 12 HOURS
Refills: 0 | Status: DISCONTINUED | OUTPATIENT
Start: 2023-12-27 | End: 2023-12-28

## 2023-12-27 RX ADMIN — AMLODIPINE BESYLATE 5 MILLIGRAM(S): 2.5 TABLET ORAL at 06:31

## 2023-12-27 RX ADMIN — ENOXAPARIN SODIUM 40 MILLIGRAM(S): 100 INJECTION SUBCUTANEOUS at 17:34

## 2023-12-27 RX ADMIN — Medication 100 MILLIGRAM(S): at 17:34

## 2023-12-27 RX ADMIN — PANTOPRAZOLE SODIUM 40 MILLIGRAM(S): 20 TABLET, DELAYED RELEASE ORAL at 06:30

## 2023-12-27 RX ADMIN — Medication 100 MILLIGRAM(S): at 09:02

## 2023-12-27 RX ADMIN — POLYETHYLENE GLYCOL 3350 17 GRAM(S): 17 POWDER, FOR SOLUTION ORAL at 06:30

## 2023-12-27 RX ADMIN — CHLORHEXIDINE GLUCONATE 1 APPLICATION(S): 213 SOLUTION TOPICAL at 06:32

## 2023-12-27 NOTE — DISCHARGE NOTE PROVIDER - NSDCCPCAREPLAN_GEN_ALL_CORE_FT
PRINCIPAL DISCHARGE DIAGNOSIS  Diagnosis: Intracranial hemorrhage  Assessment and Plan of Treatment:      PRINCIPAL DISCHARGE DIAGNOSIS  Diagnosis: Intracranial hemorrhage  Assessment and Plan of Treatment: During this hospital admission, you had a hemorrhagic stroke. During an hemorrhagic stroke, blood leaks out of your blood vessels into your brain because of a break in the blood vessel wall. This can damage areas in the brain that control other parts of the body. The blood will get absorbed back into your body over time like a bruise.   Doing your regular tasks may be difficult after you've had a stroke, but you can learn new ways to manage your daily activities. In fact, doing daily activities may help you to regain muscle strength. Be patient, give yourself time to adjust, and appreciate the progress you make. When showering or bathing, test the water temperature with a hand or foot that was not affected by the stroke. Use grab bars, a shower seat, a hand-held showerhead, and a long-handled brush. For dressing, dress while sitting, starting with the affected side or limb. Wear shirts that pull easily over your head and pants or skirts with elastic waistbands. Use zippers with loops attached to the pull tabs. You will learn additional new ways to manage after a stroke in rehabilitation. It is normal to feel fatigue after a stroke, while some days may be worse than others, you will continue to improve.  Call 911 right away if you have any of the following symptoms of another stroke:  B: Balance: Sudden: Dizziness, loss of balance, or a sense of falling, difficulty with coordinating movement  E: Eyes: Sudden double vision or trouble seeing in one or both eyes  F: Face: Sudden uneven face  A: Arms (Legs): Sudden weakness, tingling, or loss of feeling on one side of your face or body  S: Speech: Sudden trouble talking or slurred speech, sudden difficulty understanding others  T: Time: Please call 911 right away and go to the emergency room  •Sudden, severe headache  •Blackouts or seizures  Follow up with stroke clinic and your PCP in 1 week. Check your BP daily twice aday at home.

## 2023-12-27 NOTE — DISCHARGE NOTE PROVIDER - PROVIDER TOKENS
PROVIDER:[TOKEN:[[id number]]],PROVIDER:[TOKEN:[[id number]]] PROVIDER:[TOKEN:[[id number]]],PROVIDER:[TOKEN:[[id number]]]

## 2023-12-27 NOTE — CHART NOTE - NSCHARTNOTESELECT_GEN_ALL_CORE
Event Note
Transfer Note
Rolling worker Script/Event Note
Acceptance note/Event Note
Neurosurgery/Off Service Note

## 2023-12-27 NOTE — PROGRESS NOTE ADULT - ASSESSMENT
68F w/ PMHx of HTN (not on meds), GERD, L eye glaucoma admitted with R thalamic IPH (ICH 0) likely 2/2 to hypertensive emergency.     #Left sided paresthesias due to R Thalamic Hemorrhagic stroke, Etiology due to Hypertension Emergency  - on labetalol 100mg BID, Amlodipine 5mg daily  - SBP goal 110-150  - labetalol 5mg IVP PRN  - MRI showing R thal bleed and few microhemorrhages (not cortical)  - PT and physiatry recommended Home PT    #HTN  - c/w labetalol and amlodipine     #GERD  - c/w Pantoprazole 40mg    #Misc  - DVT Prophylaxis: enoxaparin Injectable  Intermittent Pneumatic Compression  - GI Prophylaxis: pantoprazole    Tablet 40 milliGRAM(s) Oral before breakfast  - Diet: DASH  - Code Status: Full    Dispo: Home PT, Rolling walker script given to SW    Pendin) monitor BP for 24 hrs then dc tomorrow

## 2023-12-27 NOTE — DISCHARGE NOTE PROVIDER - NSDCMRMEDTOKEN_GEN_ALL_CORE_FT
BRIMONIDINE TARTRATE 0.15% DRP: INSTILL 1 DROP BOTH EYES 2 TIMES A DAY  DORZOLAMIDE-TIMOLOL EYE DROPS: INSTILL 1 DROP INTO BOTH EYES TWICE A DAY  LATANOPROST 0.005% EYE DROPS: INSTILL 1 DROP BOTH EYES AT BEDTIME  omeprazole 40 mg oral delayed release capsule: 1 cap(s) orally once a day   amLODIPine 5 mg oral tablet: 1 tab(s) orally once a day  BRIMONIDINE TARTRATE 0.15% DRP: INSTILL 1 DROP BOTH EYES 2 TIMES A DAY  dorzolamide-timolol 2.23%-0.68% (2%-0.5% base) preservative-free ophthalmic solution: 1 drop(s) to each affected eye every 12 hours  labetalol 100 mg oral tablet: 1 tab(s) orally every 12 hours  LATANOPROST 0.005% EYE DROPS: INSTILL 1 DROP BOTH EYES AT BEDTIME  omeprazole 40 mg oral delayed release capsule: 1 cap(s) orally once a day  simethicone 80 mg oral tablet, chewable: 1 tab(s) chewed once a day as needed for gas retention As needed for gas retention

## 2023-12-27 NOTE — DISCHARGE NOTE PROVIDER - NSDCHHATTENDCERT_GEN_ALL_CORE
-- DO NOT REPLY / DO NOT REPLY ALL --  -- Message is from the [facility]--    General Patient Message      Reason for Call: Patient would like to drop off [facility] paperwork to be completed by Dr. Adams.      Caller Information       Type Contact Phone    06/03/2022 10:26 AM CDT Phone (Incoming) Ashlie Saenz (Emergency Contact) [phone]          Alternative phone number: None    Turnaround time given to caller:   \"This message will be sent to [state Provider's name]. The clinical team will fulfill your request as soon as they review your message.\"     My signature below certifies that the above stated patient is homebound and upon completion of the Face-To-Face encounter, has the need for intermittent skilled nursing, physical therapy and/or speech or occupational therapy services in their home for their current diagnosis as outlined in their initial plan of care. These services will continue to be monitored by myself or another physician.

## 2023-12-27 NOTE — DISCHARGE NOTE PROVIDER - HOSPITAL COURSE
Hospital course:  68F w/ PMHx of GERD, glaucoma presents for L sided numbness and LUE weakness that started on day of admission. Pt initially went to urgent care for evaluation, however SBP was measured at 190, 220 on repeat; pt was instructed to go to ED. SBP in 220s in ED. CTH showed 1.3cm R thalamic IPH. Pt denies any AC/AP. Pt given Labetalol IVP and started on Nicardipine gtt. While in NSICU arterial line was placed for treatment of hypertensive emergency. Repeat CTH showed stability. Pt was titrated off of cardene while maintaining a stable neurological exam. Pt transitioned to PO labetolol and amlodipine for HTN.  Pt was in stroke service for 3E tele for 2 days, BP was stable on PO meds. Repeat CTH and MRI were stable.    Admission scores:  ICH: 0 (22 Dec 2023 17:42)    During this hospital course, patient had a hemorrhagic stroke located in right thalamus as seen on MRI/CT.   The stroke etiology is likely secondary to:  []atrial fibrillation  []small vessel disease from atherosclerotic risk factors  [x]other: Hypertension  []etiology workup still in progress    Patient had the following workup done in house:  CT Head: Right thalamic parenchymal hematoma measuring up to 1.3 cm.  MR Head Non Contrast:  Redemonstrated small hemorrhage in the right thalamus with mild   surrounding edema. Mild chronic microvascular ischemic changes. Mild ventriculomegaly.  CT Angio Head and neck: No evidence of major vascular stenosis or occlusion.  []echo  TTE 68%  []labs [ 5.9 ] A1c     [94 ] LDL      TSH [ 2.29]  []other    Physical exam at discharge:  - Mental Status: AAOx3; speech is fluent with intact naming, repetition, and comprehension  - Cranial Nerves II -XII:  II: PERRLA; chronic left monocular vision loss due to glaucoma  III, IV, VI: EOMI, no nystagmus appreciated  V: facial sensation intact to light touch V1-V3 b/l  VII: subtle left facial droop  VIII: hearing intact to finger rub b/l  XI: head turning and shoulder shrug intact b/l  XII: tongue protrusion midline  - Motor: strength is 5/5 b/l LLE & RLE, 5/5 b/l LUE & RUE. normal muscle bulk and tone throughout, no pronator drift  - Sensory: decreased sensation on the left side  - Cerebellum: dysmetria on left UE  - Gait: deferred    NIHSS: 3 (subtle left facial, left UE dysmetria, less sensation on the left side of the body)  mRS during discharge:     New medications on discharge:  Labs to be followed up:  Imaging to be done as outpatient:  Further outpatient workup:   Hospital course:  68F w/ PMHx of GERD, glaucoma presents for L sided numbness and LUE weakness that started on day of admission. Pt initially went to urgent care for evaluation, however SBP was measured at 190, 220 on repeat; pt was instructed to go to ED. SBP in 220s in ED. CTH showed 1.3cm R thalamic IPH. Pt denies any AC/AP. Pt given Labetalol IVP and started on Nicardipine gtt. While in NSICU arterial line was placed for treatment of hypertensive emergency. Repeat CTH showed stability. Pt was titrated off of cardene while maintaining a stable neurological exam. Pt transitioned to PO labetolol and amlodipine for HTN.  Pt was in stroke service for 3E tele for 2 days, BP was stable on PO meds. Repeat CTH and MRI were stable.    Admission scores:  ICH: 0 (22 Dec 2023 17:42)    During this hospital course, patient had a hemorrhagic stroke located in right thalamus as seen on MRI/CT.   The stroke etiology is likely secondary to:  []atrial fibrillation  []small vessel disease from atherosclerotic risk factors  [x]other: Hypertension  []etiology workup still in progress    Patient had the following workup done in house:  CT Head: Right thalamic parenchymal hematoma measuring up to 1.3 cm.  MR Head Non Contrast:  Redemonstrated small hemorrhage in the right thalamus with mild   surrounding edema. Mild chronic microvascular ischemic changes. Mild ventriculomegaly.  CT Angio Head and neck: No evidence of major vascular stenosis or occlusion.  []echo  TTE 68%  []labs [ 5.9 ] A1c     [94 ] LDL      TSH [ 2.29]  []other    Physical exam at discharge:  - Mental Status: AAOx3; speech is fluent with intact naming, repetition, and comprehension  - Cranial Nerves II -XII:  II: PERRLA; chronic left monocular vision loss due to glaucoma  III, IV, VI: EOMI, no nystagmus appreciated  V: facial sensation intact to light touch V1-V3 b/l  VII: subtle left facial droop  VIII: hearing intact to finger rub b/l  XI: head turning and shoulder shrug intact b/l  XII: tongue protrusion midline  - Motor: strength is 5/5 b/l LLE & RLE, 5/5 b/l LUE & RUE. normal muscle bulk and tone throughout, no pronator drift  - Sensory: decreased sensation on the left side  - Cerebellum: dysmetria on left UE  - Gait: deferred    NIHSS: 3 (subtle left facial, left UE dysmetria, less sensation on the left side of the body)  mRS during discharge: 2    New medications on discharge:  Labs to be followed up:  Imaging to be done as outpatient:  Further outpatient workup:      Stroke attending attestation:  Pt is a 69 yo F with PMhx of glaucoma, GERD, HTN, who presented with left sided numbness.     Impr: right thalamic IPH  Etiology: HTN microangiopathy  HTN optimization  Outpt sleep study  F/u in stroke clinic and with PCP   Hospital course:  68F w/ PMHx of GERD, glaucoma presents for L sided numbness and LUE weakness that started on day of admission. Pt initially went to urgent care for evaluation, however SBP was measured at 190, 220 on repeat; pt was instructed to go to ED. SBP in 220s in ED. CTH showed 1.3cm R thalamic IPH. Pt denies any AC/AP. Pt given Labetalol IVP and started on Nicardipine gtt. While in NSICU arterial line was placed for treatment of hypertensive emergency. Repeat CTH showed stability. Pt was titrated off of cardene while maintaining a stable neurological exam. Pt transitioned to PO labetolol and amlodipine for HTN.  Pt was in stroke service for 3E tele for 2 days, BP was stable on PO meds. Repeat CTH and MRI were stable.    Admission scores:  ICH: 0 (22 Dec 2023 17:42)    During this hospital course, patient had a hemorrhagic stroke located in right thalamus as seen on MRI/CT.   The stroke etiology is likely secondary to:  []atrial fibrillation  []small vessel disease from atherosclerotic risk factors  [x]other: Hypertension  []etiology workup still in progress    Patient had the following workup done in house:  CT Head: Right thalamic parenchymal hematoma measuring up to 1.3 cm.  MR Head Non Contrast:  Redemonstrated small hemorrhage in the right thalamus with mild   surrounding edema. Mild chronic microvascular ischemic changes. Mild ventriculomegaly.  CT Angio Head and neck: No evidence of major vascular stenosis or occlusion.  []echo  TTE 68%  []labs [ 5.9 ] A1c     [94 ] LDL      TSH [ 2.29]  []other    Physical exam at discharge:  - Mental Status: AAOx3; speech is fluent with intact naming, repetition, and comprehension  - Cranial Nerves II -XII:  II: PERRLA; chronic left monocular vision loss due to glaucoma  III, IV, VI: EOMI, no nystagmus appreciated  V: facial sensation intact to light touch V1-V3 b/l  VII: subtle left facial droop  VIII: hearing intact to finger rub b/l  XI: head turning and shoulder shrug intact b/l  XII: tongue protrusion midline  - Motor: strength is 5/5 b/l LLE & RLE, 5/5 b/l LUE & RUE. normal muscle bulk and tone throughout, no pronator drift  - Sensory: decreased sensation on the left side  - Cerebellum: dysmetria on left UE  - Gait: deferred    NIHSS: 3 (subtle left facial, left UE dysmetria, less sensation on the left side of the body)  mRS during discharge: 2    New medications on discharge:  Labs to be followed up:  Imaging to be done as outpatient:  Further outpatient workup:      Stroke attending attestation:  Pt is a 67 yo F with PMhx of glaucoma, GERD, HTN, who presented with left sided numbness.     Impr: right thalamic IPH  Etiology: HTN microangiopathy  HTN optimization  Outpt sleep study  F/u in stroke clinic and with PCP

## 2023-12-27 NOTE — PROGRESS NOTE ADULT - ASSESSMENT
R thalamic CVA, hemorrhagic  - downgraded from NICU  - BB and CCB for bp control  - well controlled but has episodic spikes  - needs inpt vs outpt urine metanephrines    d/c planning for AM

## 2023-12-27 NOTE — CHART NOTE - NSCHARTNOTEFT_GEN_A_CORE
Diagnosis Stroke.  Patient has decreased maria a; increased time in double stance; decreased step length, impaired balance; decreased strength. Needs Rolling walker for safe ambulation

## 2023-12-27 NOTE — PROGRESS NOTE ADULT - SUBJECTIVE AND OBJECTIVE BOX
Pt seen, son at bedside. Son states that his mother lookls much better but has gotten weaker in her legs    T(F): , Max: 98 (12-27-23 @ 13:30)  HR: 87 (12-27-23 @ 18:36) (82 - 95)  BP: 127/58 (12-27-23 @ 18:36)  RR: 18 (12-27-23 @ 13:30)  SpO2: 97% (12-27-23 @ 13:30)  IN: 360 mL / OUT: 0 mL / NET: 360 mL      General: No apparent distress  Cardiovascular: S1, S2  Gastrointestinal: Soft, Non-tender, Non-distended  Respiratory: Good air entry bilaterally  Lymphatic: No edema  Dermatologic: Skin dry                          13.6   7.85  )-----------( 281      ( 27 Dec 2023 07:03 )             42.1     12-27    141  |  106  |  11  ----------------------------<  103<H>  4.2   |  23  |  0.5<L>    Ca    9.0      27 Dec 2023 07:03  Phos  4.0     12-27  Mg     2.2     12-27    TPro  6.7  /  Alb  3.9  /  TBili  0.3  /  DBili  x   /  AST  12  /  ALT  13  /  AlkPhos  94  12-27

## 2023-12-27 NOTE — PROGRESS NOTE ADULT - SUBJECTIVE AND OBJECTIVE BOX
Neurology Stroke Progress Note    INTERVAL HPI/OVERNIGHT EVENTS:  Patient seen and examined.  Pt was transferred from NICU to . Pt had  right thalamic hemorrhagic stroke. BP meds transitioned from IV to PO. Last evening her BP onetime was high, hydralazine and labetalol IVP was given which controlled her BP. No other events overnight.    MEDICATIONS  (STANDING):  amLODIPine   Tablet 5 milliGRAM(s) Oral daily  chlorhexidine 2% Cloths 1 Application(s) Topical <User Schedule>  dorzolamide 2%/timolol 0.5% Ophthalmic Solution 1 Drop(s) Left EYE every 12 hours  enoxaparin Injectable 40 milliGRAM(s) SubCutaneous every 24 hours  labetalol 100 milliGRAM(s) Oral every 12 hours  ondansetron Injectable 4 milliGRAM(s) IV Push once  pantoprazole    Tablet 40 milliGRAM(s) Oral before breakfast  polyethylene glycol 3350 17 Gram(s) Oral two times a day  senna 2 Tablet(s) Oral at bedtime    MEDICATIONS  (PRN):  acetaminophen     Tablet .. 650 milliGRAM(s) Oral every 6 hours PRN Temp greater or equal to 38C (100.4F), Mild Pain (1 - 3)  benzocaine 20% Spray 1 Spray(s) Topical every 6 hours PRN sore throat    Allergies    No Known Allergies    Intolerances      Vital Signs Last 24 Hrs  T(C): 36.2 (27 Dec 2023 05:52), Max: 37.6 (26 Dec 2023 17:30)  T(F): 97.1 (27 Dec 2023 05:52), Max: 99.6 (26 Dec 2023 17:30)  HR: 95 (27 Dec 2023 05:52) (92 - 110)  BP: 183/85 (27 Dec 2023 05:52) (116/56 - 200/90)  BP(mean): 122 (27 Dec 2023 05:52) (81 - 122)  RR: 18 (27 Dec 2023 05:52) (16 - 18)  SpO2: 98% (26 Dec 2023 20:43) (98% - 99%)    Parameters below as of 27 Dec 2023 05:52  Patient On (Oxygen Delivery Method): room air    Neurological:  - Mental Status: AAOx3; speech is fluent with intact naming, repetition, and comprehension  - Cranial Nerves II -XII:  II: PERRLA; chronic left monocular vision loss due to glaucoma  III, IV, VI: EOMI, no nystagmus appreciated  V: facial sensation intact to light touch V1-V3 b/l  VII: subtle left facial droop  VIII: hearing intact to finger rub b/l  XI: head turning and shoulder shrug intact b/l  XII: tongue protrusion midline  - Motor: strength is 5/5 b/l LLE & RLE, 5/5 b/l LUE & RUE. normal muscle bulk and tone throughout, no pronator drift  - Sensory: decreased sensation on the left side  - Cerebellum: dysmetria on left UE  - Gait: deferred    NIHSS: 3 (subtle left facial, left UE dysmetria, less sensation on the left side of the body)    LABS:                        13.6   7.85  )-----------( 281      ( 27 Dec 2023 07:03 )             42.1     12-27    141  |  106  |  11  ----------------------------<  103<H>  4.2   |  23  |  0.5<L>    Ca    9.0      27 Dec 2023 07:03  Phos  4.0     12-27  Mg     2.2     12-27    TPro  6.7  /  Alb  3.9  /  TBili  0.3  /  DBili  x   /  AST  12  /  ALT  13  /  AlkPhos  94  12-27      Urinalysis Basic - ( 27 Dec 2023 07:03 )    Color: x / Appearance: x / SG: x / pH: x  Gluc: 103 mg/dL / Ketone: x  / Bili: x / Urobili: x   Blood: x / Protein: x / Nitrite: x   Leuk Esterase: x / RBC: x / WBC x   Sq Epi: x / Non Sq Epi: x / Bacteria: x        RADIOLOGY & ADDITIONAL TESTS:       ACC: [id number] EXAM:  MR BRAIN   ORDERED BY: MARLIN ANGUIANO     PROCEDURE DATE:  12/27/2023          INTERPRETATION:  CLINICAL INDICATION: Intracranial hemorrhage, follow-up.    TECHNIQUE: Multi-planar multi-sequential MR imaging of the brain was   performed without intravenous contrast.    COMPARISON: Correlated with the CT head dated 12/26/2023.      IMPRESSION:    Redemonstrated small hemorrhage in the right thalamus with mild   surrounding edema.    Mild chronic microvascular ischemic changes.    Mild ventriculomegaly.    --- End of Report ---            ELIUD GOOD MD; Attending Radiologist  This document has been electronically signed. Dec 27 2023 12:08PM      ACC: [id number] EXAM:  CT ANGIO NECK STROKE PROTCL IC   ORDERED BY: STEPHY CONTRERAS     ACC: [id number] EXAM:  CT ANGIO BRAIN STROKE PROTC IC   ORDERED BY: STEPHY CONTRERAS     PROCEDURE DATE:  12/22/2023    IMPRESSION:    No evidence of major vascular stenosis or occlusion.    --- End of Report ---            NEYMAR LUEVANO MD; Attending Radiologist  This document has been electronically signed. Dec 22 2023  4:30PM   Neurology Stroke Progress Note    INTERVAL HPI/OVERNIGHT EVENTS:  Patient seen and examined.  Pt was transferred from NICU to . Pt had  right thalamic hemorrhagic stroke. BP meds transitioned from IV to PO. Last evening her BP onetime was high, hydralazine and labetalol IVP was given which controlled her BP. No other events overnight.    MEDICATIONS  (STANDING):  amLODIPine   Tablet 5 milliGRAM(s) Oral daily  chlorhexidine 2% Cloths 1 Application(s) Topical <User Schedule>  dorzolamide 2%/timolol 0.5% Ophthalmic Solution 1 Drop(s) Left EYE every 12 hours  enoxaparin Injectable 40 milliGRAM(s) SubCutaneous every 24 hours  labetalol 100 milliGRAM(s) Oral every 12 hours  ondansetron Injectable 4 milliGRAM(s) IV Push once  pantoprazole    Tablet 40 milliGRAM(s) Oral before breakfast  polyethylene glycol 3350 17 Gram(s) Oral two times a day  senna 2 Tablet(s) Oral at bedtime    MEDICATIONS  (PRN):  acetaminophen     Tablet .. 650 milliGRAM(s) Oral every 6 hours PRN Temp greater or equal to 38C (100.4F), Mild Pain (1 - 3)  benzocaine 20% Spray 1 Spray(s) Topical every 6 hours PRN sore throat    Allergies    No Known Allergies    Intolerances      Vital Signs Last 24 Hrs  T(C): 36.2 (27 Dec 2023 05:52), Max: 37.6 (26 Dec 2023 17:30)  T(F): 97.1 (27 Dec 2023 05:52), Max: 99.6 (26 Dec 2023 17:30)  HR: 95 (27 Dec 2023 05:52) (92 - 110)  BP: 183/85 (27 Dec 2023 05:52) (116/56 - 200/90)  BP(mean): 122 (27 Dec 2023 05:52) (81 - 122)  RR: 18 (27 Dec 2023 05:52) (16 - 18)  SpO2: 98% (26 Dec 2023 20:43) (98% - 99%)    Parameters below as of 27 Dec 2023 05:52  Patient On (Oxygen Delivery Method): room air    Neurological:  - Mental Status: AAOx3; speech is fluent with intact naming, repetition, and comprehension  - Cranial Nerves II -XII:  II: PERRLA; chronic left monocular vision loss due to glaucoma  III, IV, VI: EOMI, no nystagmus appreciated  V: facial sensation intact to light touch V1-V3 b/l  VII: subtle left facial droop  VIII: hearing intact to finger rub b/l  XI: head turning and shoulder shrug intact b/l  XII: tongue protrusion midline  - Motor: strength is 5/5 b/l LLE & RLE, 5/5 b/l LUE & RUE. normal muscle bulk and tone throughout, no pronator drift  - Sensory: decreased sensation on the left side  - Cerebellum: dysmetria on left UE  - Gait: deferred    NIHSS: 3 (subtle left facial, left UE dysmetria, less sensation on the left side of the body)    LABS:                        13.6   7.85  )-----------( 281      ( 27 Dec 2023 07:03 )             42.1     12-27    141  |  106  |  11  ----------------------------<  103<H>  4.2   |  23  |  0.5<L>    Ca    9.0      27 Dec 2023 07:03  Phos  4.0     12-27  Mg     2.2     12-27    TPro  6.7  /  Alb  3.9  /  TBili  0.3  /  DBili  x   /  AST  12  /  ALT  13  /  AlkPhos  94  12-27      Urinalysis Basic - ( 27 Dec 2023 07:03 )    Color: x / Appearance: x / SG: x / pH: x  Gluc: 103 mg/dL / Ketone: x  / Bili: x / Urobili: x   Blood: x / Protein: x / Nitrite: x   Leuk Esterase: x / RBC: x / WBC x   Sq Epi: x / Non Sq Epi: x / Bacteria: x        RADIOLOGY & ADDITIONAL TESTS:       ACC: [id number] EXAM:  MR BRAIN   ORDERED BY: MARLIN ANGUIANO     PROCEDURE DATE:  12/27/2023          INTERPRETATION:  CLINICAL INDICATION: Intracranial hemorrhage, follow-up.    TECHNIQUE: Multi-planar multi-sequential MR imaging of the brain was   performed without intravenous contrast.    COMPARISON: Correlated with the CT head dated 12/26/2023.      IMPRESSION:    Redemonstrated small hemorrhage in the right thalamus with mild   surrounding edema.    Mild chronic microvascular ischemic changes.    Mild ventriculomegaly.    --- End of Report ---            ELIUD GOOD MD; Attending Radiologist  This document has been electronically signed. Dec 27 2023 12:08PM      ACC: [id number] EXAM:  CT ANGIO NECK STROKE PROTCL IC   ORDERED BY: STEPHY CONTRERAS     ACC: [id number] EXAM:  CT ANGIO BRAIN STROKE PROTC IC   ORDERED BY: STEPHY CONTRERAS     PROCEDURE DATE:  12/22/2023    IMPRESSION:    No evidence of major vascular stenosis or occlusion.    --- End of Report ---            NEYMAR LUEVANO MD; Attending Radiologist  This document has been electronically signed. Dec 22 2023  4:30PM

## 2023-12-27 NOTE — DISCHARGE NOTE PROVIDER - CARE PROVIDER_API CALL
Ulysses Dickson  Neurology  [address] [place] [id number]  Phone: [phone]  Fax: [phone]  Follow Up Time:     PATTI TANG  [id number] [address]  Phone: ([phone]  Fax: ()-  Follow Up Time:    Ulysses Dickson  Neurology  [address] [place] [id number]  Phone: [phone]  Fax: [phone]  Follow Up Time:     PATTI TANG  [address] [address]  Phone: [phone]  Fax: ()-  Follow Up Time:

## 2023-12-28 VITALS — DIASTOLIC BLOOD PRESSURE: 56 MMHG | HEART RATE: 77 BPM | SYSTOLIC BLOOD PRESSURE: 120 MMHG

## 2023-12-28 PROCEDURE — 99232 SBSQ HOSP IP/OBS MODERATE 35: CPT

## 2023-12-28 PROCEDURE — 99238 HOSP IP/OBS DSCHRG MGMT 30/<: CPT

## 2023-12-28 RX ORDER — OMEPRAZOLE 10 MG/1
1 CAPSULE, DELAYED RELEASE ORAL
Qty: 30 | Refills: 5
Start: 2023-12-28 | End: 2024-06-24

## 2023-12-28 RX ORDER — AMLODIPINE BESYLATE 2.5 MG/1
1 TABLET ORAL
Qty: 30 | Refills: 5
Start: 2023-12-28 | End: 2024-06-24

## 2023-12-28 RX ORDER — SIMETHICONE 80 MG/1
1 TABLET, CHEWABLE ORAL
Qty: 30 | Refills: 0
Start: 2023-12-28 | End: 2024-01-26

## 2023-12-28 RX ORDER — LABETALOL HCL 100 MG
1 TABLET ORAL
Qty: 60 | Refills: 5
Start: 2023-12-28 | End: 2024-06-24

## 2023-12-28 RX ORDER — DORZOLAMIDE HYDROCHLORIDE TIMOLOL MALEATE 20; 5 MG/ML; MG/ML
1 SOLUTION/ DROPS OPHTHALMIC
Qty: 0 | Refills: 0 | DISCHARGE
Start: 2023-12-28

## 2023-12-28 RX ADMIN — Medication 100 MILLIGRAM(S): at 05:31

## 2023-12-28 RX ADMIN — AMLODIPINE BESYLATE 5 MILLIGRAM(S): 2.5 TABLET ORAL at 05:31

## 2023-12-28 RX ADMIN — CHLORHEXIDINE GLUCONATE 1 APPLICATION(S): 213 SOLUTION TOPICAL at 05:35

## 2023-12-28 RX ADMIN — PANTOPRAZOLE SODIUM 40 MILLIGRAM(S): 20 TABLET, DELAYED RELEASE ORAL at 05:31

## 2023-12-28 NOTE — PROGRESS NOTE ADULT - TIME BILLING
Review of imaging and chart; obtaining history; examination of pt; discussion and coordination of care.
Review of imaging and chart; obtaining history; examination of pt; discussion and coordination of care.

## 2023-12-28 NOTE — PROGRESS NOTE ADULT - SUBJECTIVE AND OBJECTIVE BOX
GIOVANNI HOUSTON  68y  Female      Patient is a 68y old  Female who presents with a chief complaint of ICH (28 Dec 2023 07:49)      INTERVAL HPI/OVERNIGHT EVENTS:    Vital Signs Last 24 Hrs  T(C): 35.4 (28 Dec 2023 05:08), Max: 36.9 (27 Dec 2023 21:52)  T(F): 95.7 (28 Dec 2023 05:08), Max: 98.4 (27 Dec 2023 21:52)  HR: 77 (28 Dec 2023 08:00) (77 - 89)  BP: 120/56 (28 Dec 2023 08:00) (103/51 - 175/84)  BP(mean): 80 (28 Dec 2023 08:00) (73 - 112)  RR: 18 (28 Dec 2023 05:08) (18 - 18)  SpO2: 98% (27 Dec 2023 21:52) (97% - 98%)    Parameters below as of 27 Dec 2023 21:52  Patient On (Oxygen Delivery Method): room air                Consultant(s) Notes Reviewed:  [x ] YES  [ ] NO          MEDICATIONS  (STANDING):  amLODIPine   Tablet 5 milliGRAM(s) Oral daily  chlorhexidine 2% Cloths 1 Application(s) Topical <User Schedule>  dorzolamide 2%/timolol 0.5% Ophthalmic Solution 1 Drop(s) Left EYE every 12 hours  enoxaparin Injectable 40 milliGRAM(s) SubCutaneous every 24 hours  labetalol 100 milliGRAM(s) Oral every 12 hours  ondansetron Injectable 4 milliGRAM(s) IV Push once  pantoprazole    Tablet 40 milliGRAM(s) Oral before breakfast    MEDICATIONS  (PRN):  acetaminophen     Tablet .. 650 milliGRAM(s) Oral every 6 hours PRN Temp greater or equal to 38C (100.4F), Mild Pain (1 - 3)  benzocaine 20% Spray 1 Spray(s) Topical every 6 hours PRN sore throat      LABS                          13.6   7.85  )-----------( 281      ( 27 Dec 2023 07:03 )             42.1     12-27    141  |  106  |  11  ----------------------------<  103<H>  4.2   |  23  |  0.5<L>    Ca    9.0      27 Dec 2023 07:03  Phos  4.0     12-27  Mg     2.2     12-27    TPro  6.7  /  Alb  3.9  /  TBili  0.3  /  DBili  x   /  AST  12  /  ALT  13  /  AlkPhos  94  12-27      Urinalysis Basic - ( 27 Dec 2023 07:03 )    Color: x / Appearance: x / SG: x / pH: x  Gluc: 103 mg/dL / Ketone: x  / Bili: x / Urobili: x   Blood: x / Protein: x / Nitrite: x   Leuk Esterase: x / RBC: x / WBC x   Sq Epi: x / Non Sq Epi: x / Bacteria: x        Lactate Trend        CAPILLARY BLOOD GLUCOSE            RADIOLOGY & ADDITIONAL TESTS:    Imaging Personally Reviewed:  [ ] YES  [ ] NO    HEALTH ISSUES - PROBLEM Dx:          PHYSICAL EXAM:  GENERAL: NAD, well-developed.  HEAD:  Atraumatic, Normocephalic.  EYES: EOMI, PERRLA, conjunctiva and sclera clear.  NECK: Supple, No JVD.  CHEST/LUNG: Clear to auscultation bilaterally; No wheeze.  HEART: Regular rate and rhythm; S1 S2.   ABDOMEN: Soft, Nontender, Nondistended; Bowel sounds present.  EXTREMITIES:  2+ Peripheral Pulses, No clubbing, cyanosis, or edema.  PSYCH: AAOx3.  NEUROLOGY: non-focal.  SKIN: No rashes or lesions.   GIOVANNI HOUSTON  68y  Female      Patient is a 68y old  Female who presents with a chief complaint of ICH (28 Dec 2023 07:49)      INTERVAL HPI/OVERNIGHT EVENTS:  Discharged today.   Vital Signs Last 24 Hrs  T(C): 35.4 (28 Dec 2023 05:08), Max: 36.9 (27 Dec 2023 21:52)  T(F): 95.7 (28 Dec 2023 05:08), Max: 98.4 (27 Dec 2023 21:52)  HR: 77 (28 Dec 2023 08:00) (77 - 89)  BP: 120/56 (28 Dec 2023 08:00) (103/51 - 175/84)  BP(mean): 80 (28 Dec 2023 08:00) (73 - 112)  RR: 18 (28 Dec 2023 05:08) (18 - 18)  SpO2: 98% (27 Dec 2023 21:52) (97% - 98%)    Parameters below as of 27 Dec 2023 21:52  Patient On (Oxygen Delivery Method): room air                Consultant(s) Notes Reviewed:  [x ] YES  [ ] NO          MEDICATIONS  (STANDING):  amLODIPine   Tablet 5 milliGRAM(s) Oral daily  chlorhexidine 2% Cloths 1 Application(s) Topical <User Schedule>  dorzolamide 2%/timolol 0.5% Ophthalmic Solution 1 Drop(s) Left EYE every 12 hours  enoxaparin Injectable 40 milliGRAM(s) SubCutaneous every 24 hours  labetalol 100 milliGRAM(s) Oral every 12 hours  ondansetron Injectable 4 milliGRAM(s) IV Push once  pantoprazole    Tablet 40 milliGRAM(s) Oral before breakfast    MEDICATIONS  (PRN):  acetaminophen     Tablet .. 650 milliGRAM(s) Oral every 6 hours PRN Temp greater or equal to 38C (100.4F), Mild Pain (1 - 3)  benzocaine 20% Spray 1 Spray(s) Topical every 6 hours PRN sore throat      LABS                          13.6   7.85  )-----------( 281      ( 27 Dec 2023 07:03 )             42.1     12-27    141  |  106  |  11  ----------------------------<  103<H>  4.2   |  23  |  0.5<L>    Ca    9.0      27 Dec 2023 07:03  Phos  4.0     12-27  Mg     2.2     12-27    TPro  6.7  /  Alb  3.9  /  TBili  0.3  /  DBili  x   /  AST  12  /  ALT  13  /  AlkPhos  94  12-27      Urinalysis Basic - ( 27 Dec 2023 07:03 )    Color: x / Appearance: x / SG: x / pH: x  Gluc: 103 mg/dL / Ketone: x  / Bili: x / Urobili: x   Blood: x / Protein: x / Nitrite: x   Leuk Esterase: x / RBC: x / WBC x   Sq Epi: x / Non Sq Epi: x / Bacteria: x        Lactate Trend        CAPILLARY BLOOD GLUCOSE            RADIOLOGY & ADDITIONAL TESTS:    Imaging Personally Reviewed:  [ ] YES  [ ] NO    HEALTH ISSUES - PROBLEM Dx:          PHYSICAL EXAM:  GENERAL: NAD, well-developed.  HEAD:  Atraumatic, Normocephalic.  EYES: EOMI, PERRLA, conjunctiva and sclera clear.  NECK: Supple, No JVD.  CHEST/LUNG: Clear to auscultation bilaterally; No wheeze.  HEART: Regular rate and rhythm; S1 S2.   ABDOMEN: Soft, Nontender, Nondistended; Bowel sounds present.  EXTREMITIES:  2+ Peripheral Pulses, No clubbing, cyanosis, or edema.  PSYCH:   NEUROLOGY:

## 2023-12-28 NOTE — PROGRESS NOTE ADULT - SUBJECTIVE AND OBJECTIVE BOX
Neurology Stroke Progress Note    INTERVAL HPI/OVERNIGHT EVENTS:  Patient seen and examined. BP this morning was 173 SBP. Otherwise within range. No events overnight. Possible dc today.  MEDICATIONS  (STANDING):  amLODIPine   Tablet 5 milliGRAM(s) Oral daily  chlorhexidine 2% Cloths 1 Application(s) Topical <User Schedule>  dorzolamide 2%/timolol 0.5% Ophthalmic Solution 1 Drop(s) Left EYE every 12 hours  enoxaparin Injectable 40 milliGRAM(s) SubCutaneous every 24 hours  labetalol 100 milliGRAM(s) Oral every 12 hours  ondansetron Injectable 4 milliGRAM(s) IV Push once  pantoprazole    Tablet 40 milliGRAM(s) Oral before breakfast    MEDICATIONS  (PRN):  acetaminophen     Tablet .. 650 milliGRAM(s) Oral every 6 hours PRN Temp greater or equal to 38C (100.4F), Mild Pain (1 - 3)  benzocaine 20% Spray 1 Spray(s) Topical every 6 hours PRN sore throat    Allergies    No Known Allergies    Intolerances      Vital Signs Last 24 Hrs  T(C): 35.4 (28 Dec 2023 05:08), Max: 36.9 (27 Dec 2023 21:52)  T(F): 95.7 (28 Dec 2023 05:08), Max: 98.4 (27 Dec 2023 21:52)  HR: 80 (28 Dec 2023 05:08) (80 - 92)  BP: 173/78 (28 Dec 2023 05:08) (103/51 - 175/84)  BP(mean): 112 (28 Dec 2023 05:08) (73 - 112)  RR: 18 (28 Dec 2023 05:08) (18 - 18)  SpO2: 98% (27 Dec 2023 21:52) (97% - 98%)    Parameters below as of 27 Dec 2023 21:52  Patient On (Oxygen Delivery Method): room air        Neurological:  - Mental Status: AAOx3; speech is fluent with intact naming, repetition, and comprehension  - Cranial Nerves II -XII:  II: PERRLA; chronic left monocular vision loss due to glaucoma  III, IV, VI: EOMI, no nystagmus appreciated  V: facial sensation intact to light touch V1-V3 b/l  VII: subtle left facial droop  VIII: hearing intact to finger rub b/l  XI: head turning and shoulder shrug intact b/l  XII: tongue protrusion midline  - Motor: strength is 5/5 b/l LLE & RLE, 5/5 b/l LUE & RUE. normal muscle bulk and tone throughout, no pronator drift  - Sensory: decreased sensation on the left side  - Cerebellum: dysmetria on left UE  - Gait: deferred    NIHSS: 3 (subtle left facial, left UE dysmetria, less sensation on the left side of the body)    LABS:                        13.6   7.85  )-----------( 281      ( 27 Dec 2023 07:03 )             42.1     12-27    141  |  106  |  11  ----------------------------<  103<H>  4.2   |  23  |  0.5<L>    Ca    9.0      27 Dec 2023 07:03  Phos  4.0     12-27  Mg     2.2     12-27    TPro  6.7  /  Alb  3.9  /  TBili  0.3  /  DBili  x   /  AST  12  /  ALT  13  /  AlkPhos  94  12-27      Urinalysis Basic - ( 27 Dec 2023 07:03 )    Color: x / Appearance: x / SG: x / pH: x  Gluc: 103 mg/dL / Ketone: x  / Bili: x / Urobili: x   Blood: x / Protein: x / Nitrite: x   Leuk Esterase: x / RBC: x / WBC x   Sq Epi: x / Non Sq Epi: x / Bacteria: x        RADIOLOGY & ADDITIONAL TESTS:   ACC: [id number] EXAM:  MR BRAIN   ORDERED BY: MARLIN ANGUIANO     PROCEDURE DATE:  12/27/2023          INTERPRETATION:  CLINICAL INDICATION: Intracranial hemorrhage, follow-up.    TECHNIQUE: Multi-planar multi-sequential MR imaging of the brain was   performed without intravenous contrast.    COMPARISON: Correlated with the CT head dated 12/26/2023.      IMPRESSION:    Redemonstrated small hemorrhage in the right thalamus with mild   surrounding edema.    Mild chronic microvascular ischemic changes.    Mild ventriculomegaly.    --- End of Report ---            ELIUD GOOD MD; Attending Radiologist  This document has been electronically signed. Dec 27 2023 12:08PM      ACC: [id number] EXAM:  CT ANGIO NECK STROKE PROTCL IC   ORDERED BY: STEPHY CONTRERAS     ACC: [id number] EXAM:  CT ANGIO BRAIN STROKE PROTC IC   ORDERED BY: STEPHY CONTRERAS     PROCEDURE DATE:  12/22/2023    IMPRESSION:    No evidence of major vascular stenosis or occlusion.    --- End of Report ---            NEYMAR LUEVANO MD; Attending Radiologist  This document has been electronically signed. Dec 22 2023  4:30PM   Neurology Stroke Progress Note    INTERVAL HPI/OVERNIGHT EVENTS:  Patient seen and examined. BP this morning was 173 SBP. Otherwise within range. No events overnight. Possible dc today.  MEDICATIONS  (STANDING):  amLODIPine   Tablet 5 milliGRAM(s) Oral daily  chlorhexidine 2% Cloths 1 Application(s) Topical <User Schedule>  dorzolamide 2%/timolol 0.5% Ophthalmic Solution 1 Drop(s) Left EYE every 12 hours  enoxaparin Injectable 40 milliGRAM(s) SubCutaneous every 24 hours  labetalol 100 milliGRAM(s) Oral every 12 hours  ondansetron Injectable 4 milliGRAM(s) IV Push once  pantoprazole    Tablet 40 milliGRAM(s) Oral before breakfast    MEDICATIONS  (PRN):  acetaminophen     Tablet .. 650 milliGRAM(s) Oral every 6 hours PRN Temp greater or equal to 38C (100.4F), Mild Pain (1 - 3)  benzocaine 20% Spray 1 Spray(s) Topical every 6 hours PRN sore throat    Allergies    No Known Allergies    Intolerances      Vital Signs Last 24 Hrs  T(C): 35.4 (28 Dec 2023 05:08), Max: 36.9 (27 Dec 2023 21:52)  T(F): 95.7 (28 Dec 2023 05:08), Max: 98.4 (27 Dec 2023 21:52)  HR: 80 (28 Dec 2023 05:08) (80 - 92)  BP: 173/78 (28 Dec 2023 05:08) (103/51 - 175/84)  BP(mean): 112 (28 Dec 2023 05:08) (73 - 112)  RR: 18 (28 Dec 2023 05:08) (18 - 18)  SpO2: 98% (27 Dec 2023 21:52) (97% - 98%)    Parameters below as of 27 Dec 2023 21:52  Patient On (Oxygen Delivery Method): room air        Neurological:  - Mental Status: AAOx3; speech is fluent with intact naming, repetition, and comprehension  - Cranial Nerves II -XII:  II: PERRLA; chronic left monocular vision loss due to glaucoma  III, IV, VI: EOMI, no nystagmus appreciated  V: facial sensation intact to light touch V1-V3 b/l  VII: subtle left facial droop  VIII: hearing intact to finger rub b/l  XI: head turning and shoulder shrug intact b/l  XII: tongue protrusion midline  - Motor: strength is 5/5 b/l LLE & RLE, 5/5 b/l LUE & RUE. normal muscle bulk and tone throughout, no pronator drift  - Sensory: decreased sensation on the left side  - Cerebellum: dysmetria on left UE  - Gait: deferred    NIHSS: 3 (subtle left facial, left UE dysmetria, less sensation on the left side of the body)    LABS:                        13.6   7.85  )-----------( 281      ( 27 Dec 2023 07:03 )             42.1     12-27    141  |  106  |  11  ----------------------------<  103<H>  4.2   |  23  |  0.5<L>    Ca    9.0      27 Dec 2023 07:03  Phos  4.0     12-27  Mg     2.2     12-27    TPro  6.7  /  Alb  3.9  /  TBili  0.3  /  DBili  x   /  AST  12  /  ALT  13  /  AlkPhos  94  12-27      Urinalysis Basic - ( 27 Dec 2023 07:03 )    Color: x / Appearance: x / SG: x / pH: x  Gluc: 103 mg/dL / Ketone: x  / Bili: x / Urobili: x   Blood: x / Protein: x / Nitrite: x   Leuk Esterase: x / RBC: x / WBC x   Sq Epi: x / Non Sq Epi: x / Bacteria: x        RADIOLOGY & ADDITIONAL TESTS:   ACC: [id number] EXAM:  MR BRAIN   ORDERED BY: MARLIN ANGUIANO     PROCEDURE DATE:  12/27/2023          INTERPRETATION:  CLINICAL INDICATION: Intracranial hemorrhage, follow-up.    TECHNIQUE: Multi-planar multi-sequential MR imaging of the brain was   performed without intravenous contrast.    COMPARISON: Correlated with the CT head dated 12/26/2023.      IMPRESSION:    Redemonstrated small hemorrhage in the right thalamus with mild   surrounding edema.    Mild chronic microvascular ischemic changes.    Mild ventriculomegaly.    --- End of Report ---            ELIUD GOOD MD; Attending Radiologist  This document has been electronically signed. Dec 27 2023 12:08PM      ACC: [id number] EXAM:  CT ANGIO NECK STROKE PROTCL IC   ORDERED BY: STEPHY CONTRERAS     ACC: [id number] EXAM:  CT ANGIO BRAIN STROKE PROTC IC   ORDERED BY: STEPHY CONTRERAS     PROCEDURE DATE:  12/22/2023    IMPRESSION:    No evidence of major vascular stenosis or occlusion.    --- End of Report ---            NEYMAR LUEVANO MD; Attending Radiologist  This document has been electronically signed. Dec 22 2023  4:30PM

## 2023-12-28 NOTE — PROGRESS NOTE ADULT - ASSESSMENT
68F w/ PMHx of HTN (not on meds), GERD, L eye glaucoma admitted with R thalamic IPH (ICH 0) likely 2/2 to hypertensive emergency.     A/P:   Acute Right Thalamic Hemorrhagic stroke,   Patient presented with Left sided paresthesias, due to Hypertension  Brain MRI showed MRI right thaolamic bleed and few micro hemorrhages (not cortical)  SBP goal 110-150  Continue labetalol 100mg BID, Amlodipine 5mg daily  PT and physiatry recommended Home PT    HTN: labetalol and amlodipine     GERD Pantoprazole 40mg  DVT Prophylaxis: Lovenox SC.

## 2023-12-28 NOTE — PROGRESS NOTE ADULT - ATTENDING COMMENTS
Right thalamic IPH 2/2 hypertensive angiopathy  Antihypertensives, d/c home with outpt therapy.
Pt is a 69 yo F with PMhx of glaucoma, GERD, HTN, who presented with left sided numbness. NIHSS 1.    Impr: right thalamic IPH  Etiology: HTN microangiopathy  BP optimization  Outpt sleep study  PT/OT/ST, tele, -150, dispo planning- possible home tomorrow pending BP control

## 2023-12-28 NOTE — PROGRESS NOTE ADULT - ASSESSMENT
68F w/ PMHx of HTN (not on meds), GERD, L eye glaucoma admitted with R thalamic IPH (ICH 0) likely 2/2 to hypertensive emergency.     #Left sided paresthesias due to R Thalamic Hemorrhagic stroke, Etiology due to Hypertension  - on labetalol 100mg BID, Amlodipine 5mg daily  - SBP goal 110-150  - labetalol 5mg IVP PRN  - MRI showing R thal bleed and few microhemorrhages (not cortical)  - PT and physiatry recommended Home PT    #HTN  - c/w labetalol and amlodipine     #GERD  - c/w Pantoprazole 40mg    #Misc  - DVT Prophylaxis: enoxaparin Injectable  Intermittent Pneumatic Compression  - GI Prophylaxis: pantoprazole    Tablet 40 milliGRAM(s) Oral before breakfast  - Diet: DASH  - Code Status: Full    Dispo: Home PT, Rolling walker script given to SW    Pendin) dc home today

## 2024-01-02 DIAGNOSIS — K21.9 GASTRO-ESOPHAGEAL REFLUX DISEASE WITHOUT ESOPHAGITIS: ICD-10-CM

## 2024-01-02 DIAGNOSIS — G93.6 CEREBRAL EDEMA: ICD-10-CM

## 2024-01-02 DIAGNOSIS — I61.1 NONTRAUMATIC INTRACEREBRAL HEMORRHAGE IN HEMISPHERE, CORTICAL: ICD-10-CM

## 2024-01-02 DIAGNOSIS — R29.701 NIHSS SCORE 1: ICD-10-CM

## 2024-01-02 DIAGNOSIS — G81.94 HEMIPLEGIA, UNSPECIFIED AFFECTING LEFT NONDOMINANT SIDE: ICD-10-CM

## 2024-01-02 DIAGNOSIS — B34.8 OTHER VIRAL INFECTIONS OF UNSPECIFIED SITE: ICD-10-CM

## 2024-01-02 DIAGNOSIS — H40.9 UNSPECIFIED GLAUCOMA: ICD-10-CM

## 2024-01-02 DIAGNOSIS — I16.1 HYPERTENSIVE EMERGENCY: ICD-10-CM

## 2024-01-03 PROBLEM — Z00.00 ENCOUNTER FOR PREVENTIVE HEALTH EXAMINATION: Status: ACTIVE | Noted: 2024-01-03

## 2024-01-03 PROBLEM — K21.9 GASTRO-ESOPHAGEAL REFLUX DISEASE WITHOUT ESOPHAGITIS: Chronic | Status: ACTIVE | Noted: 2023-12-22

## 2024-01-03 PROBLEM — H40.9 UNSPECIFIED GLAUCOMA: Chronic | Status: ACTIVE | Noted: 2023-12-22

## 2024-01-18 ENCOUNTER — APPOINTMENT (OUTPATIENT)
Age: 69
End: 2024-01-18
Payer: MEDICARE

## 2024-01-18 VITALS
SYSTOLIC BLOOD PRESSURE: 136 MMHG | HEIGHT: 64 IN | BODY MASS INDEX: 20.49 KG/M2 | HEART RATE: 68 BPM | OXYGEN SATURATION: 99 % | DIASTOLIC BLOOD PRESSURE: 74 MMHG | WEIGHT: 120 LBS

## 2024-01-18 DIAGNOSIS — I61.9 NONTRAUMATIC INTRACEREBRAL HEMORRHAGE, UNSPECIFIED: ICD-10-CM

## 2024-01-18 DIAGNOSIS — I10 ESSENTIAL (PRIMARY) HYPERTENSION: ICD-10-CM

## 2024-01-18 DIAGNOSIS — Z87.19 PERSONAL HISTORY OF OTHER DISEASES OF THE DIGESTIVE SYSTEM: ICD-10-CM

## 2024-01-18 PROCEDURE — 99204 OFFICE O/P NEW MOD 45 MIN: CPT

## 2024-01-18 RX ORDER — DORZOLAMIDE HYDROCHLORIDE AND TIMOLOL MALEATE 20; 5 MG/ML; MG/ML
22.3-6.8 SOLUTION/ DROPS OPHTHALMIC
Refills: 0 | Status: ACTIVE | COMMUNITY

## 2024-01-18 RX ORDER — AMLODIPINE BESYLATE 5 MG/1
5 TABLET ORAL
Refills: 0 | Status: ACTIVE | COMMUNITY

## 2024-01-18 RX ORDER — BRIMONIDINE TARTRATE 1.5 MG/ML
0.15 SOLUTION/ DROPS OPHTHALMIC
Refills: 0 | Status: ACTIVE | COMMUNITY

## 2024-01-18 RX ORDER — LABETALOL HYDROCHLORIDE 100 MG/1
100 TABLET, FILM COATED ORAL
Refills: 0 | Status: ACTIVE | COMMUNITY

## 2024-01-18 RX ORDER — LATANOPROST/PF 0.005 %
0.01 DROPS OPHTHALMIC (EYE)
Refills: 0 | Status: ACTIVE | COMMUNITY

## 2024-01-18 RX ORDER — OMEPRAZOLE 40 MG/1
40 CAPSULE, DELAYED RELEASE ORAL
Refills: 0 | Status: ACTIVE | COMMUNITY

## 2024-01-18 NOTE — PHYSICAL EXAM
[FreeTextEntry1] : Mental status: Awake, alert and oriented x3. Follows commands.  Naming, repetition intact. Attention/ concentration intact.  No dysarthria, no aphasia.    Cranial nerves: Chronic left eye blindness, right eye visual fields full, no nystagmus, extraocular muscles intact, V1 through V3 intact bilaterally and symmetric, face asymmetric (mild left droop), hearing intact to finger rub.  Motor:  MRC grading 5/5 b/l UE/LE, except proximal LUE 5-/5.   strength 5/5.  Normal tone and bulk.  No abnormal movements.   Sensation: Decreased sensation of the LUE and LLE to temperature and vibration. Touch is equal throughout.   Coordination: No dysmetria on finger-to-nose; slightly slower on the left.   Reflexes: 2+ in bilateral UE, 3+ bilateral LE.  Gait: Steady cautious gait.

## 2024-01-18 NOTE — ASSESSMENT
[FreeTextEntry1] : Ms. HOUSTON 68 year old female presents today for a HFU visit s/p hemorrhagic CVA. Has made improvements since DC with PT and would like to continue with PT. Discussed BP control today and advised to establish care with cardiology to help with BP management.   Plan:  -C/w PT  -Establish care with cardiology -Sleep study  -REEG  -MR head in 1-3 months -RTC after imaging

## 2024-01-18 NOTE — HISTORY OF PRESENT ILLNESS
[FreeTextEntry1] : Ms. HOUSTON 68 year old female presents today for a hospital follow up visit.  PMHx of GERD, glaucoma, HTN. Son assists with translation.   Presented to [facility] December 2023, for L sided numbness and LUE weakness. At the time SBP was persistently elevated. CTH showed 1.3 cm R thalamic IPH. Not on ASA. Was admitted on titratable cardiac gtt to assist with BP control. Repeat CTH and MRI were stable.  Currently,  she still has some numbness in the left hand, she still notes some weakness, and recognizes her facial asymmetry. She is making improvements, says she has gotten home PT which has completed. She would like to continue with PT and she does the home exercises which were recommended. No new neurologic complaints since leaving hospital including vision, speech, new/worsening weakness, or new/worsening paresthesia. Does not drink alcohol, never smoker.  Family denies drooling staring into space and incontinence.  She takes BP at home usually is 120s when she wakes up, in the evening goes up to about 140-150 and then it comes down. She is compliant with her medications.  Has seen her PCP since discharge from the hospital. Does not have a cardiologist at this time.

## 2024-01-18 NOTE — DATA REVIEWED
[de-identified] : MR head:   IMPRESSION:    Redemonstrated small hemorrhage in the right thalamus with mild surrounding edema.    Mild chronic microvascular ischemic changes.    Mild ventriculomegaly.      CTA H/N:   IMPRESSION:    No evidence of major vascular stenosis or occlusion.      Initial CT head 12/22/23:    IMPRESSION:    Right thalamic parenchymal hematoma measuring up to 1.3 cm.       12/22:   IMPRESSION:    In comparison to the prior CT head examination of 12/22/2023,    Stable intraparenchymal hematoma within the right thalamus measuring up to 1.3 cm.      12/23:   IMPRESSION:  No significant interval change.     12/26:   IMPRESSION:    Since prior CT head 12/23/2023, redemonstrated 1.4 cm right thalamic hematoma without significant change.

## 2024-01-31 ENCOUNTER — APPOINTMENT (OUTPATIENT)
Dept: NEUROLOGY | Facility: CLINIC | Age: 69
End: 2024-01-31
Payer: MEDICARE

## 2024-01-31 PROCEDURE — 95816 EEG AWAKE AND DROWSY: CPT

## 2024-02-05 ENCOUNTER — NON-APPOINTMENT (OUTPATIENT)
Age: 69
End: 2024-02-05

## 2024-04-10 ENCOUNTER — APPOINTMENT (OUTPATIENT)
Dept: NEUROLOGY | Facility: CLINIC | Age: 69
End: 2024-04-10

## 2024-04-27 ENCOUNTER — OUTPATIENT (OUTPATIENT)
Dept: OUTPATIENT SERVICES | Facility: HOSPITAL | Age: 69
LOS: 1 days | End: 2024-04-27
Payer: MEDICARE

## 2024-04-27 ENCOUNTER — RESULT REVIEW (OUTPATIENT)
Age: 69
End: 2024-04-27

## 2024-04-27 DIAGNOSIS — I61.9 NONTRAUMATIC INTRACEREBRAL HEMORRHAGE, UNSPECIFIED: ICD-10-CM

## 2024-04-27 DIAGNOSIS — Z00.8 ENCOUNTER FOR OTHER GENERAL EXAMINATION: ICD-10-CM

## 2024-04-27 PROCEDURE — 70551 MRI BRAIN STEM W/O DYE: CPT

## 2024-04-27 PROCEDURE — 70551 MRI BRAIN STEM W/O DYE: CPT | Mod: 26,MH

## 2024-04-28 DIAGNOSIS — I61.9 NONTRAUMATIC INTRACEREBRAL HEMORRHAGE, UNSPECIFIED: ICD-10-CM

## 2024-05-07 DIAGNOSIS — G91.9 HYDROCEPHALUS, UNSPECIFIED: ICD-10-CM

## 2024-05-23 ENCOUNTER — APPOINTMENT (OUTPATIENT)
Dept: NEUROLOGY | Facility: CLINIC | Age: 69
End: 2024-05-23

## 2024-06-19 ENCOUNTER — APPOINTMENT (OUTPATIENT)
Dept: NEUROLOGY | Facility: CLINIC | Age: 69
End: 2024-06-19

## 2024-06-24 ENCOUNTER — NON-APPOINTMENT (OUTPATIENT)
Age: 69
End: 2024-06-24

## 2024-07-10 ENCOUNTER — APPOINTMENT (OUTPATIENT)
Dept: NEUROSURGERY | Facility: CLINIC | Age: 69
End: 2024-07-10
Payer: MEDICARE

## 2024-07-10 VITALS — WEIGHT: 120 LBS | HEIGHT: 64 IN | BODY MASS INDEX: 20.49 KG/M2

## 2024-07-10 DIAGNOSIS — I61.9 NONTRAUMATIC INTRACEREBRAL HEMORRHAGE, UNSPECIFIED: ICD-10-CM

## 2024-07-10 PROCEDURE — 99203 OFFICE O/P NEW LOW 30 MIN: CPT
